# Patient Record
Sex: MALE | Race: WHITE
[De-identification: names, ages, dates, MRNs, and addresses within clinical notes are randomized per-mention and may not be internally consistent; named-entity substitution may affect disease eponyms.]

---

## 2017-06-14 ENCOUNTER — HOSPITAL ENCOUNTER (EMERGENCY)
Dept: HOSPITAL 10 - FTE | Age: 42
Discharge: HOME | End: 2017-06-14
Payer: MEDICAID

## 2017-06-14 VITALS — HEART RATE: 69 BPM | DIASTOLIC BLOOD PRESSURE: 96 MMHG | SYSTOLIC BLOOD PRESSURE: 145 MMHG | RESPIRATION RATE: 18 BRPM

## 2017-06-14 VITALS
WEIGHT: 153.22 LBS | HEIGHT: 70 IN | BODY MASS INDEX: 21.94 KG/M2 | HEIGHT: 70 IN | BODY MASS INDEX: 21.94 KG/M2 | WEIGHT: 153.22 LBS

## 2017-06-14 DIAGNOSIS — F10.10: ICD-10-CM

## 2017-06-14 DIAGNOSIS — R40.2252: ICD-10-CM

## 2017-06-14 DIAGNOSIS — F17.210: ICD-10-CM

## 2017-06-14 DIAGNOSIS — R40.2142: ICD-10-CM

## 2017-06-14 DIAGNOSIS — R00.2: Primary | ICD-10-CM

## 2017-06-14 DIAGNOSIS — R40.2362: ICD-10-CM

## 2017-06-14 PROCEDURE — 96375 TX/PRO/DX INJ NEW DRUG ADDON: CPT

## 2017-06-14 PROCEDURE — 96374 THER/PROPH/DIAG INJ IV PUSH: CPT

## 2017-06-14 PROCEDURE — 93005 ELECTROCARDIOGRAM TRACING: CPT

## 2017-06-14 NOTE — ERD
ER Documentation


Chief Complaint


Date/Time


DATE: 17 


TIME: 21:19


Chief Complaint


anxiety, chest pain





HPI


42-year-old male with a long history of alcohol abuse presents the ED 

complaining of anxiety and palpitations after drinking 2 bottles of Captain 

Dheeraj today.  Denies chest pain or shortness of breath.  Nausea but denies 

abdominal pain, vomiting, hematemesis, hematochezia or melanotic stools.  

Denies depression, suicidal or homicidal ideations.  No cough or hemoptysis.  

No leg pain or swelling.  No relieving or exacerbating factors.  No fevers or 

chills.





ROS


All systems reviewed and are negative except as per history of present illness.





Medications


Home Meds


Active Scripts


Ondansetron (Ondansetron Odt) 4 Mg Tab.rapdis, 4 MG PO Q6H Y for NAUSEA AND/OR 

VOMITING, #10 TAB


   Prov:NIEVES ROSA MD         17





Allergies


Allergies:  


Coded Allergies:  


     sulfamethoxazole (Verified  Allergy, Mild, HIVES, 17)


     trimethoprim (Verified  Allergy, Mild, HIVES, 17)





PMhx/Soc


Reviewed in chart.  As per HPI


Medical and Surgical Hx:  pt denies Surgical Hx


History of Surgery:  No


Anesthesia Reaction:  No


Hx Neurological Disorder:  No


Hx Respiratory Disorders:  No


Hx Cardiac Disorders:  No


Hx Psychiatric Problems:  No


Hx Miscellaneous Medical Probl:  Yes (GERD, ALCOHOL ABUSE)


Hx Alcohol Use:  Yes (alcoholic - drank 2 bottles of liquer friday)


Hx Substance Use:  No


Hx Tobacco Use:  Yes (10 cig/ day)


Smoking Status:  Current every day smoker





FmHx


Reviewed in chart.  No sudden cardiac death or cancer





Physical Exam


Vitals





Vital Signs








  Date Time  Temp Pulse Resp B/P Pulse Ox O2 Delivery O2 Flow Rate FiO2


 


17 19:55 99.3 125 20 142/85 97   








Physical Exam


Const:  []


Head:   Atraumatic 


Eyes:    Normal Conjunctiva


ENT:    Normal External Ears, Nose and Mouth.


Neck:               Full range of motion..~ No meningismus.


Resp:    Clear to auscultation bilaterally


Cardio:    Regular rate and rhythm, no murmurs


Abd:    Soft, non tender, non distended. Normal bowel sounds


Skin:    No petechiae or rashes


Back:    No midline or flank tenderness


Ext:    No cyanosis, or edema


Neur:    Awake and alert


Psych:    Normal Mood and Affect


Results 24 hrs








 Current Medications








 Medications


  (Trade)  Dose


 Ordered  Sig/Dejon


 Route


 PRN Reason  Start Time


 Stop Time Status Last Admin


Dose Admin


 


 Sodium Chloride


  (NS)  1,000 ml @ 


 1,000 mls/hr  Q1H STAT


 IV


   17 21:19


 17 22:18 DC 17 21:25


 


 


 Ondansetron HCl


  (Zofran Inj)  4 mg  ONCE  STAT


 IV


   17 21:19


 17 21:21 DC 17 21:25


 


 


 Famotidine


  (Pepcid)  20 mg  ONCE  STAT


 PO


   17 21:19


 17 21:21 DC 17 21:25


 


 


 Lorazepam


  (Ativan)  1 mg  ONCE  ONCE


 IV


   17 21:30


 17 21:31 DC 17 21:25


 


 


 Thiamine HCl


  (Vitamin B1)  100 mg  ONCE  ONCE


 IV


   17 21:30


 17 21:31 DC 17 21:33


 














Procedures/MDM


DOCUMENTS REVIEWED:   ED nurse, prior ED, prior





ED COURSE: Normal saline 1 L.  Thiamine 100 mg.  Ativan 1 mg.  Zofran 4 mg





REEXAMINATION/REEVALUATION: Time: 23:00.  Doing well.  Heart rate 69, sinus 

rhythm without ectopy.  Symptoms resolved and wants to go home.








MEDICAL DECISION MAKIN-year-old male with a long history of alcohol abuse 

presents the ED complaining of anxiety and palpitations after drinking 2 

bottles of Captain Dheeraj today.  Patient presents with acute anxiety and mild 

alcohol withdrawal.  No hallucinations or evidence of DTs.  Symptoms completely 

resolved with intravenous hydration and lorazepam.  Abdominal exam is benign 

without rebound, guarding or signs of peritonitis.  Sinus tachycardia resolved 

with intravenous hydration and anxiolytics.  Stable for discharge with 

precautionary instructions and outpatient follow-up as counseled.  Extensive 

alcohol cessation counseling provided.








Counseled patient regarding diagnostic workup, diagnosis and need for followup. 

Understands to return to ED if symptoms recur, worsen or any other concerns.





Departure


Diagnosis:  


 Primary Impression:  


 Palpitations


 Additional Impressions:  


 Acute anxiety


 Chronic alcohol abuse


Condition:  Serious











CATRACHITA TREVIZO MD 2017 21:19

## 2017-06-16 ENCOUNTER — HOSPITAL ENCOUNTER (EMERGENCY)
Dept: HOSPITAL 10 - FTE | Age: 42
Discharge: HOME | End: 2017-06-16
Payer: MEDICAID

## 2017-06-16 VITALS — HEART RATE: 88 BPM | DIASTOLIC BLOOD PRESSURE: 84 MMHG | RESPIRATION RATE: 16 BRPM | SYSTOLIC BLOOD PRESSURE: 132 MMHG

## 2017-06-16 VITALS
BODY MASS INDEX: 19.52 KG/M2 | BODY MASS INDEX: 19.52 KG/M2 | HEIGHT: 74 IN | WEIGHT: 152.12 LBS | WEIGHT: 152.12 LBS | HEIGHT: 74 IN

## 2017-06-16 DIAGNOSIS — F17.210: ICD-10-CM

## 2017-06-16 DIAGNOSIS — R11.10: ICD-10-CM

## 2017-06-16 DIAGNOSIS — R10.13: Primary | ICD-10-CM

## 2017-06-16 LAB
ADD SCAN DIFF: NO
ADD UMIC: YES
ALBUMIN SERPL-MCNC: 4.6 G/DL (ref 3.3–4.9)
ALBUMIN/GLOB SERPL: 1.43 {RATIO}
ALP SERPL-CCNC: 139 IU/L (ref 42–121)
ALT SERPL-CCNC: 59 IU/L (ref 13–69)
ANION GAP SERPL CALC-SCNC: 14 MMOL/L (ref 8–16)
AST SERPL-CCNC: 47 IU/L (ref 15–46)
BASOPHILS # BLD AUTO: 0 10^3/UL (ref 0–0.1)
BASOPHILS NFR BLD: 0.5 % (ref 0–2)
BILIRUB DIRECT SERPL-MCNC: 0 MG/DL (ref 0–0.2)
BILIRUB SERPL-MCNC: 0.6 MG/DL (ref 0.2–1.3)
BUN SERPL-MCNC: 4 MG/DL (ref 7–20)
CALCIUM SERPL-MCNC: 9.6 MG/DL (ref 8.4–10.2)
CHLORIDE SERPL-SCNC: 104 MMOL/L (ref 97–110)
CO2 SERPL-SCNC: 25 MMOL/L (ref 21–31)
COLOR UR: (no result)
CREAT SERPL-MCNC: 0.69 MG/DL (ref 0.61–1.24)
EOSINOPHIL # BLD: 0.1 10^3/UL (ref 0–0.5)
EOSINOPHIL NFR BLD: 1.5 % (ref 0–7)
ERYTHROCYTE [DISTWIDTH] IN BLOOD BY AUTOMATED COUNT: 11.9 % (ref 11.5–14.5)
GLOBULIN SER-MCNC: 3.2 G/DL (ref 1.3–3.2)
GLUCOSE SERPL-MCNC: 97 MG/DL (ref 70–220)
GLUCOSE UR STRIP-MCNC: NEGATIVE %
HCT VFR BLD CALC: 46.5 % (ref 42–52)
HGB BLD-MCNC: 16 G/DL (ref 14–18)
KETONES UR STRIP.AUTO-MCNC: NEGATIVE MG/DL
LYMPHOCYTES # BLD AUTO: 2.5 10^3/UL (ref 0.8–2.9)
LYMPHOCYTES NFR BLD AUTO: 29 % (ref 15–51)
MCH RBC QN AUTO: 30.8 PG (ref 29–33)
MCHC RBC AUTO-ENTMCNC: 34.4 G/DL (ref 32–37)
MCV RBC AUTO: 89.6 FL (ref 82–101)
MONOCYTES # BLD: 0.4 10^3/UL (ref 0.3–0.9)
MONOCYTES NFR BLD: 5.1 % (ref 0–11)
NEUTROPHILS # BLD: 5.5 10^3/UL (ref 1.6–7.5)
NEUTROPHILS NFR BLD AUTO: 63.7 % (ref 39–77)
NITRITE UR QL STRIP.AUTO: NEGATIVE
NRBC # BLD MANUAL: 0 10^3/UL (ref 0–0)
NRBC BLD QL: 0 /100WBC (ref 0–0)
PLATELET # BLD: 269 10^3/UL (ref 140–415)
PMV BLD AUTO: 10.4 FL (ref 7.4–10.4)
POTASSIUM SERPL-SCNC: 3.4 MMOL/L (ref 3.5–5.1)
PROT SERPL-MCNC: 7.8 G/DL (ref 6.1–8.1)
RBC # BLD AUTO: 5.19 10^6/UL (ref 4.7–6.1)
RBC # UR AUTO: (no result) /UL
RBC #/AREA URNS HPF: (no result) /HPF
SODIUM SERPL-SCNC: 140 MMOL/L (ref 135–144)
UR BILIRUBIN (DIP): NEGATIVE
UR CLARITY: CLEAR
UR TOTAL PROTEIN (DIP): NEGATIVE
UROBILINOGEN UR STRIP-ACNC: (no result) (ref 0.1–1)
WBC # BLD AUTO: 8.6 10^3/UL (ref 4.8–10.8)
WBC # UR STRIP: NEGATIVE /UL

## 2017-06-16 PROCEDURE — 80053 COMPREHEN METABOLIC PANEL: CPT

## 2017-06-16 PROCEDURE — 83690 ASSAY OF LIPASE: CPT

## 2017-06-16 PROCEDURE — 36415 COLL VENOUS BLD VENIPUNCTURE: CPT

## 2017-06-16 PROCEDURE — 96374 THER/PROPH/DIAG INJ IV PUSH: CPT

## 2017-06-16 PROCEDURE — 74176 CT ABD & PELVIS W/O CONTRAST: CPT

## 2017-06-16 PROCEDURE — 85025 COMPLETE CBC W/AUTO DIFF WBC: CPT

## 2017-06-16 PROCEDURE — 81001 URINALYSIS AUTO W/SCOPE: CPT

## 2017-06-16 NOTE — ERD
ER Documentation


Chief Complaint


Date/Time


DATE: 17 


TIME: 20:14


Chief Complaint


R jaw pain since this morning





HPI


Patient is a 42-year-old male who presents to the ED with abdominal pain, back 

pain and and right sided face pain on and off x 1 week.  Patient was seen here 

in the ER 2 days ago.  Patient has a history of alcohol abuse.  From Friday to 

Wednesday patient had 2 bottles of Captain Dheeraj and 20 large cans of beer.  

Patient states that he has had multiple episodes of nonbloody nonbilious emesis 

for the last week.  States that he had a decrease in appetite and feels 

nauseous.  Denies chest pain or cough or shortness of breath.  Denies headache 

or dizziness.  Denies leg pain or swelling.  Denies diarrhea.  He states that 

he has not had any alcohol in the last 2 days.  He has tolerated fluids such as 

orange juice and water. Denies trauma or hitting his head. Denies blurry vision.





ROS


All systems reviewed and are negative except as per history of present illness.





Medications


Home Meds


Active Scripts


Famotidine* (Pepcid*) 20 Mg Tablet, 20 MG PO BID for 14 Days, TAB


   Prov:KEVIN MARIE PA-C         17


Ondansetron (Ondansetron Odt) 4 Mg Tab.rapdis, 4 MG PO Q6H Y for NAUSEA AND/OR 

VOMITING, #10 TAB


   Prov:KEVIN MARIE PA-C         17


Ondansetron (Ondansetron Odt) 4 Mg Tab.rapdis, 4 MG PO Q6H Y for NAUSEA AND/OR 

VOMITING, #10 TAB


   Prov:NIEVES ROSA MD         17





Allergies


Allergies:  


Coded Allergies:  


     sulfamethoxazole (Verified  Allergy, Mild, HIVES, 17)


     trimethoprim (Verified  Allergy, Mild, HIVES, 17)





PMhx/Soc


History of Surgery:  No


Anesthesia Reaction:  No


Hx Neurological Disorder:  No


Hx Respiratory Disorders:  No


Hx Cardiac Disorders:  No


Hx Psychiatric Problems:  No


Hx Miscellaneous Medical Probl:  Yes (GERD, ALCOHOL ABUSE)


Hx Alcohol Use:  Yes (Beer)


Hx Substance Use:  No


Hx Tobacco Use:  Yes (10 cig/ day)


Smoking Status:  Current every day smoker





FmHx


Family History:  No coronary disease, No diabetes, No other





Physical Exam


Vitals





Vital Signs








  Date Time  Temp Pulse Resp B/P Pulse Ox O2 Delivery O2 Flow Rate FiO2


 


17 19:24 98.7 101 18 142/92 97   








Physical Exam





GENERAL: Well-developed, well-nourished male. Appears in no acute distress. 


HEAD: Normocephalic, atraumatic. 


EYES: Pupils are equally reactive bilaterally. EOMs grossly intact. No 

conjunctival erythema. 


ENT: Moist mucous membranes. No uvula deviation. No kissing tonsils. No 

exudates. 


NECK: Supple. No lymphadenopathy or thyromegaly. No meningismus. negative 

kernig. negative brudinski.  


LUNG: Clear to auscultation bilaterally. No rhonchi, wheezing, rales or coarse 

breath sounds. 


HEART: Regular rate and rhythm. No murmurs, rubs or gallops.


ABDOMEN: No scars, ecchymosis or rashes noted. Soft,  Positive bowel sounds in 

all four quadrants. No rebound tenderness, no guarding. (-) McBurneys point 

tenderness.  Tenderness in the epigastric and right CVA.


BACK: No midline tenderness. 


Extremities: Equal pulses bilaterally. No peripheral clubbing, cyanosis or 

edema. No unilateral leg swelling.


NEUROLOGIC: Alert and oriented. Moving all four extremities. 5/5 strength in 

all extremities. Normal speech. Steady gait. 


SKIN: Normal color. Warm and dry. No rashes or lesions. Capillary refill < 2 

seconds


Result Diagram:  


17





Results 24 hrs





 Laboratory Tests








Test


  17


20:25 17


21:30


 


White Blood Count 8.610^3/ul  


 


Red Blood Count 5.1910^6/ul  


 


Hemoglobin 16.0g/dl  


 


Hematocrit 46.5%  


 


Mean Corpuscular Volume 89.6fl  


 


Mean Corpuscular Hemoglobin 30.8pg  


 


Mean Corpuscular Hemoglobin


Concent 34.4g/dl 


  


 


 


Red Cell Distribution Width 11.9%  


 


Platelet Count 76533^3/UL  


 


Mean Platelet Volume 10.4fl  


 


Neutrophils % 63.7%  


 


Lymphocytes % 29.0%  


 


Monocytes % 5.1%  


 


Eosinophils % 1.5%  


 


Basophils % 0.5%  


 


Nucleated Red Blood Cells % 0.0/100WBC  


 


Neutrophils # 5.510^3/ul  


 


Lymphocytes # 2.510^3/ul  


 


Monocytes # 0.410^3/ul  


 


Eosinophils # 0.110^3/ul  


 


Basophils # 0.010^3/ul  


 


Nucleated Red Blood Cells # 0.010^3/ul  


 


Sodium Level 140mmol/L  


 


Potassium Level 3.4mmol/L  


 


Chloride Level 104mmol/L  


 


Carbon Dioxide Level 25mmol/L  


 


Anion Gap 14  


 


Blood Urea Nitrogen 4mg/dl  


 


Creatinine 0.69mg/dl  


 


Glucose Level 97mg/dl  


 


Calcium Level 9.6mg/dl  


 


Total Bilirubin 0.6mg/dl  


 


Direct Bilirubin 0.00mg/dl  


 


Indirect Bilirubin 0.6mg/dl  


 


Aspartate Amino Transf


(AST/SGOT) 47IU/L 


  


 


 


Alanine Aminotransferase


(ALT/SGPT) 59IU/L 


  


 


 


Alkaline Phosphatase 139IU/L  


 


Total Protein 7.8g/dl  


 


Albumin 4.6g/dl  


 


Globulin 3.20g/dl  


 


Albumin/Globulin Ratio 1.43  


 


Lipase 43U/L  


 


Urine Color  LT. YELLOW 


 


Urine Clarity  CLEAR 


 


Urine pH  6.5 


 


Urine Specific Gravity  <=1.005 


 


Urine Ketones  NEGATIVE 


 


Urine Nitrite  NEGATIVE 


 


Urine Bilirubin  NEGATIVE 


 


Urine Urobilinogen  0.2  E.U./dL 


 


Urine Leukocyte Esterase  NEGATIVE 


 


Urine Microscopic RBC  0-2/HPF 


 


Urine Microscopic WBC  NONE SEEN/HPF 


 


Urine Epithelial Cells  OCCASIONAL 


 


Urine Hemoglobin  TRACE 


 


Urine Glucose  NEGATIVE% 


 


Urine Total Protein  NEGATIVE 








 Current Medications








 Medications


  (Trade)  Dose


 Ordered  Sig/Dejon


 Route


 PRN Reason  Start Time


 Stop Time Status Last Admin


Dose Admin


 


 Sodium Chloride


  (NS)  1,000 ml @ 


 1,000 mls/hr  Q1H STAT


 IV


   17 20:06


 17 21:05 DC 17 20:23


 


 


 Ondansetron HCl


  (Zofran Inj)  4 mg  ONCE  STAT


 IV


   17 20:06


 17 20:07 DC 17 20:23


 


 


 Famotidine


  (Pepcid)  20 mg  ONCE  STAT


 PO


   17 20:06


 17 20:07 DC 17 20:23


 


 


 Lorazepam


  (Ativan)  1 mg  ONCE  ONCE


 PO


   17 21:30


 17 21:31 DC 17 21:08


 











Procedures/MDM


ER COURSE:


I kept the patient and/or family informed of laboratory and diagnostic imaging 

results throughout the emergency room course.





EKG, MONITORS, & DIAGNOSTIC IMAGING:


 [place] [address]


 Radiology Main Line: [phone]





 DIAGNOSTIC IMAGING REPORT





 Patient: XENIA PÉREZ   : 1975   Age: 42  Sex: M             

           


 MR #:    [id number]   Acct #:   [id number]    DOS: 17


 Ordering MD: KEVIN MARIE PA-C   Location:  FT   Room/Bed:           

                                 


 








PROCEDURE:   CT abdomen and pelvis without intravenous contrast. 


 


CLINICAL INDICATION: Pain.


 


TECHNIQUE:   CT of the abdomen/pelvis was performed utilizing axial images with 

reconstructions in sagittal and coronal planes. The administered radiation dose 

is CTDI 7.5 mGy,  mGy-cm. 


 


COMPARISON: 2017


 


FINDINGS:


 


Visualized Chest: The visualized lung bases are clear.


 


Abdomen:


 


The  spleen, pancreas, gallbladder,and adrenal glands are unremarkable.   The 

liver is diffusely decreased in attenuation, compatible with hepatic steatosis.


 


The kidneys are without hydronephrosis.  No definite urinary calculi are seen.


 


There is no evidence of bowel obstruction.  The appendix is normal.  No intra-

abdominal free air is seen.  


 


There is no evidence of intra-abdominal adenopathy or free fluid.  


 


 


Pelvis:


 


There is no evidence of pelvic adenopathy or free fluid.  The prostate and 

bladder are unremarkable.


 


Osseous structures: Unremarkable.


 


IMPRESSION:


 


No acute findings.


 


Hepatic steatosis.


 


RPTAT: HIKT


_____________________________________________ 


.Tyrell Menjivar MD, MD           Date    Time 


Electronically viewed and signed by .Tyrell Menjivar MD, MD on 2017 21:19 


 


D:  2017 21:19  T:  2017 21:19


.T/





CC: KEVIN MARIE PA-C








MEDICATIONS:


1 mg Ativan.  1 L IV fluids.  Zofran and Pepcid.  Tolerated well and stated 

improvement in symptoms





LAB INTERPRETATION:


CBC showed no evidence of systemic infection or severe anemia. CMP showed no 

evidence of electrolyte abnormalities, severe acidosis, alkalosis, renal failure

, or liver disease. Lipase showed no evidence of acute pancreatitis. UA showed 

no evidence of leukocytes, nitrites or hematuria. 





MEDICAL DECISION MAKING:


This is a 42-year-old male who presents with epigastric pain and vomiting 1 

week. Vital signs were reviewed. Patient is afebrile. Patient is not hypoxic.  

Patient is not toxic or ill-appearing.  Patient was seen here 3 days ago for 

similar symptoms however patient did not receive any laboratory studies or 

workup.  Therefore a full workup was done as patient did have epigastric pain 

and vomiting with a history of alcohol abuse.  However I have low suspicion for 

pancreatitis.  Low suspicion for ACS, AAA, perforated ulcer, bowel obstruction, 

cholecystitis, choledocholithiasis, cholangitis, pancreatitis, hepatic abscess, 

appendicitis, diverticulitis, gastroenteritis, hepatitis, peptic ulcer disease.

  Low suspicion for cardiac emergency.  Low suspicion for delirium tremens.





DISCHARGE:


At this time, patient is stable for discharge and outpatient management with no 

new complaints during the ER course. Patient was sent home with Zofran and 

Pepcid. Patient will be discharged home with instructions to recheck for new or 

worsening symptoms such as fever, nausea, weakness, LOC and to follow up with 

primary care in the next 1-2 days. Patient was advised to return to the ER for 

any new or worsening symptoms. Plan was discussed and patient and/or family 

understands and agrees. Home instructions were given.





Departure


Diagnosis:  


 Primary Impression:  


 Abdominal pain


 Abdominal location:  epigastric  Qualified Code:  R10.13 - Epigastric pain


Condition:  Stable











KEVIN MARIE PA-C 2017 20:17

## 2017-06-16 NOTE — RADRPT
PROCEDURE:   CT abdomen and pelvis without intravenous contrast. 

 

CLINICAL INDICATION: Pain.

 

TECHNIQUE:   CT of the abdomen/pelvis was performed utilizing axial images with reconstructions in s
agittal and coronal planes. The administered radiation dose is CTDI 7.5 mGy,  mGy-cm. 

 

COMPARISON: 01/26/2017

 

FINDINGS:

 

Visualized Chest: The visualized lung bases are clear.

 

Abdomen:

 

The  spleen, pancreas, gallbladder,and adrenal glands are unremarkable.   The liver is diffusely dec
reased in attenuation, compatible with hepatic steatosis.

 

The kidneys are without hydronephrosis.  No definite urinary calculi are seen.

 

There is no evidence of bowel obstruction.  The appendix is normal.  No intra-abdominal free air is 
seen.  

 

There is no evidence of intra-abdominal adenopathy or free fluid.  

 

 

Pelvis:

 

There is no evidence of pelvic adenopathy or free fluid.  The prostate and bladder are unremarkable.

 

Osseous structures: Unremarkable.

 

IMPRESSION:

 

No acute findings.

 

Hepatic steatosis.

 

RPTAT: HIKT

_____________________________________________ 

.Tyrell Menjivar MD, MD           Date    Time 

Electronically viewed and signed by .Tyrell Menjivar MD, MD on 06/16/2017 21:19 

 

D:  06/16/2017 21:19  T:  06/16/2017 21:19

.T/

## 2017-07-06 ENCOUNTER — HOSPITAL ENCOUNTER (EMERGENCY)
Dept: HOSPITAL 10 - FTE | Age: 42
Discharge: HOME | End: 2017-07-06
Payer: MEDICAID

## 2017-07-06 VITALS
WEIGHT: 171.96 LBS | BODY MASS INDEX: 22.79 KG/M2 | WEIGHT: 171.96 LBS | HEIGHT: 73 IN | HEIGHT: 73 IN | BODY MASS INDEX: 22.79 KG/M2

## 2017-07-06 VITALS
RESPIRATION RATE: 20 BRPM | DIASTOLIC BLOOD PRESSURE: 84 MMHG | SYSTOLIC BLOOD PRESSURE: 142 MMHG | TEMPERATURE: 98.2 F | HEART RATE: 90 BPM

## 2017-07-06 DIAGNOSIS — R11.10: Primary | ICD-10-CM

## 2017-07-06 DIAGNOSIS — R19.7: ICD-10-CM

## 2017-07-06 DIAGNOSIS — F17.210: ICD-10-CM

## 2017-07-06 LAB
ADD SCAN DIFF: NO
ALBUMIN SERPL-MCNC: 5.1 G/DL (ref 3.3–4.9)
ALBUMIN/GLOB SERPL: 1.59 {RATIO}
ALP SERPL-CCNC: 127 IU/L (ref 42–121)
ALT SERPL-CCNC: 56 IU/L (ref 13–69)
ANION GAP SERPL CALC-SCNC: 24 MMOL/L (ref 8–16)
AST SERPL-CCNC: 59 IU/L (ref 15–46)
BASOPHILS # BLD AUTO: 0.1 10^3/UL (ref 0–0.1)
BASOPHILS NFR BLD: 0.7 % (ref 0–2)
BILIRUB DIRECT SERPL-MCNC: 0 MG/DL (ref 0–0.2)
BILIRUB SERPL-MCNC: 1 MG/DL (ref 0.2–1.3)
BUN SERPL-MCNC: 8 MG/DL (ref 7–20)
CALCIUM SERPL-MCNC: 9.1 MG/DL (ref 8.4–10.2)
CHLORIDE SERPL-SCNC: 96 MMOL/L (ref 97–110)
CO2 SERPL-SCNC: 19 MMOL/L (ref 21–31)
CREAT SERPL-MCNC: 0.81 MG/DL (ref 0.61–1.24)
EOSINOPHIL # BLD: 0 10^3/UL (ref 0–0.5)
EOSINOPHIL NFR BLD: 0.1 % (ref 0–7)
ERYTHROCYTE [DISTWIDTH] IN BLOOD BY AUTOMATED COUNT: 12.8 % (ref 11.5–14.5)
GLOBULIN SER-MCNC: 3.2 G/DL (ref 1.3–3.2)
GLUCOSE SERPL-MCNC: 122 MG/DL (ref 70–220)
HCT VFR BLD CALC: 47.4 % (ref 42–52)
HGB BLD-MCNC: 17 G/DL (ref 14–18)
LYMPHOCYTES # BLD AUTO: 1.1 10^3/UL (ref 0.8–2.9)
LYMPHOCYTES NFR BLD AUTO: 13.1 % (ref 15–51)
MCH RBC QN AUTO: 31.9 PG (ref 29–33)
MCHC RBC AUTO-ENTMCNC: 35.9 G/DL (ref 32–37)
MCV RBC AUTO: 88.9 FL (ref 82–101)
MONOCYTES # BLD: 0.5 10^3/UL (ref 0.3–0.9)
MONOCYTES NFR BLD: 6.1 % (ref 0–11)
NEUTROPHILS # BLD: 6.8 10^3/UL (ref 1.6–7.5)
NEUTROPHILS NFR BLD AUTO: 79.9 % (ref 39–77)
NRBC # BLD MANUAL: 0 10^3/UL (ref 0–0)
NRBC BLD QL: 0 /100WBC (ref 0–0)
PLATELET # BLD: 279 10^3/UL (ref 140–415)
PMV BLD AUTO: 9.7 FL (ref 7.4–10.4)
POTASSIUM SERPL-SCNC: 3.5 MMOL/L (ref 3.5–5.1)
PROT SERPL-MCNC: 8.3 G/DL (ref 6.1–8.1)
RBC # BLD AUTO: 5.33 10^6/UL (ref 4.7–6.1)
SODIUM SERPL-SCNC: 135 MMOL/L (ref 135–144)
WBC # BLD AUTO: 8.5 10^3/UL (ref 4.8–10.8)

## 2017-07-06 PROCEDURE — 80053 COMPREHEN METABOLIC PANEL: CPT

## 2017-07-06 PROCEDURE — 36415 COLL VENOUS BLD VENIPUNCTURE: CPT

## 2017-07-06 PROCEDURE — 96374 THER/PROPH/DIAG INJ IV PUSH: CPT

## 2017-07-06 PROCEDURE — C9113 INJ PANTOPRAZOLE SODIUM, VIA: HCPCS

## 2017-07-06 PROCEDURE — 85025 COMPLETE CBC W/AUTO DIFF WBC: CPT

## 2017-07-06 PROCEDURE — 96375 TX/PRO/DX INJ NEW DRUG ADDON: CPT

## 2017-07-06 PROCEDURE — 83690 ASSAY OF LIPASE: CPT

## 2017-07-06 NOTE — ERD
ER Documentation


Chief Complaint


Date/Time


DATE: 7/6/17 


TIME: 11:56


Chief Complaint


VOMITING AND DIARRHEA ,





HPI


This 42-year-old male complains of 3 day history of vomiting diarrhea.  Patient 

complains of epigastric abdominal pain as well.  Patient has a history of 

gastritis and alcohol abuse.  Patient has a fevers, blood, lower abdominal 

pain.  Patient denies he is taking any medications for gastritis or otherwise.  

Patient admits to drinking heavily over the last few days.





ROS


All systems reviewed and are negative except as per history of present illness.





Medications


Home Meds


Active Scripts


Pantoprazole* (Protonix*) 40 Mg Tablet.dr, 40 MG PO DAILY, #30 TAB


   Prov:TAVO RUDD MD         7/6/17


Lorazepam* (Lorazepam*) 1 Mg Tablet, 1 MG PO Q8, #10 TAB


   Prov:TAVO RUDD MD         7/6/17


Ondansetron (Ondansetron Odt) 8 Mg Tab.rapdis, 8 MG PO Q6H Y for NAUSEA AND/OR 

VOMITING, #8 TAB


   Prov:TAVO RUDD MD         7/6/17


Famotidine* (Pepcid*) 20 Mg Tablet, 20 MG PO BID for 14 Days, TAB


   Prov:KEVIN MARIE PA-C         6/16/17


Ondansetron (Ondansetron Odt) 4 Mg Tab.rapdis, 4 MG PO Q6H Y for NAUSEA AND/OR 

VOMITING, #10 TAB


   Prov:KEVIN MARIE PA-C         6/16/17


Ondansetron (Ondansetron Odt) 4 Mg Tab.rapdis, 4 MG PO Q6H Y for NAUSEA AND/OR 

VOMITING, #10 TAB


   Prov:NIEVES ROSA MD         5/25/17





Allergies


Allergies:  


Coded Allergies:  


     sulfamethoxazole (Verified  Allergy, Mild, HIVES, 5/25/17)


     trimethoprim (Verified  Allergy, Mild, HIVES, 5/25/17)





PMhx/Soc


History of Surgery:  No


Anesthesia Reaction:  No


Hx Neurological Disorder:  No


Hx Respiratory Disorders:  No


Hx Cardiac Disorders:  No


Hx Psychiatric Problems:  No


Hx Miscellaneous Medical Probl:  Yes (GERD, ALCOHOL ABUSE)


Hx Alcohol Use:  Yes (Beer)


Hx Substance Use:  No


Hx Tobacco Use:  Yes (10 cig/ day)


Smoking Status:  Current every day smoker





Physical Exam


Vitals





Vital Signs








  Date Time  Temp Pulse Resp B/P Pulse Ox O2 Delivery O2 Flow Rate FiO2


 


7/6/17 09:30 98.1 128 20 137/86 99   








Physical Exam


Const:  [] Alert, not ill-appearing


Head:   Atraumatic 


Eyes:    Normal Conjunctiva


ENT:    Normal External Ears, Nose and Mouth.


Neck:               Full range of motion..~ No meningismus.


Resp:    Clear to auscultation bilaterally


Cardio:    Regular rate and rhythm, no murmurs


Abd:    Soft, minimal epigastric tenderness.  No rebound no masses.  non 

distended. Normal bowel sounds


Skin:    No petechiae or rashes


Back:    No midline or flank tenderness


Ext:    No cyanosis, or edema


Neur:    Awake and alert


Psych:    Normal Mood and Affect


Result Diagram:  


7/6/17 1035                                                                    

            7/6/17 1035





Results 24 hrs





 Laboratory Tests








Test


  7/6/17


10:35


 


White Blood Count 8.510^3/ul 


 


Red Blood Count 5.3310^6/ul 


 


Hemoglobin 17.0g/dl 


 


Hematocrit 47.4% 


 


Mean Corpuscular Volume 88.9fl 


 


Mean Corpuscular Hemoglobin 31.9pg 


 


Mean Corpuscular Hemoglobin


Concent 35.9g/dl 


 


 


Red Cell Distribution Width 12.8% 


 


Platelet Count 16655^3/UL 


 


Mean Platelet Volume 9.7fl 


 


Neutrophils % 79.9% 


 


Lymphocytes % 13.1% 


 


Monocytes % 6.1% 


 


Eosinophils % 0.1% 


 


Basophils % 0.7% 


 


Nucleated Red Blood Cells % 0.0/100WBC 


 


Neutrophils # 6.810^3/ul 


 


Lymphocytes # 1.110^3/ul 


 


Monocytes # 0.510^3/ul 


 


Eosinophils # 0.010^3/ul 


 


Basophils # 0.110^3/ul 


 


Nucleated Red Blood Cells # 0.010^3/ul 


 


Sodium Level 135mmol/L 


 


Potassium Level 3.5mmol/L 


 


Chloride Level 96mmol/L 


 


Carbon Dioxide Level 19mmol/L 


 


Anion Gap 24 


 


Blood Urea Nitrogen 8mg/dl 


 


Creatinine 0.81mg/dl 


 


Glucose Level 122mg/dl 


 


Calcium Level 9.1mg/dl 


 


Total Bilirubin 1.0mg/dl 


 


Direct Bilirubin 0.00mg/dl 


 


Indirect Bilirubin 1.0mg/dl 


 


Aspartate Amino Transf


(AST/SGOT) 59IU/L 


 


 


Alanine Aminotransferase


(ALT/SGPT) 56IU/L 


 


 


Alkaline Phosphatase 127IU/L 


 


Total Protein 8.3g/dl 


 


Albumin 5.1g/dl 


 


Globulin 3.20g/dl 


 


Albumin/Globulin Ratio 1.59 


 


Lipase 39U/L 








 Current Medications








 Medications


  (Trade)  Dose


 Ordered  Sig/Dejon


 Route


 PRN Reason  Start Time


 Stop Time Status Last Admin


Dose Admin


 


 Sodium Chloride


  (NS)  1,000 ml @ 


 1,000 mls/hr  Q1H STAT


 IV


   7/6/17 10:20


 7/6/17 11:19 DC 7/6/17 10:39


 


 


 Hydromorphone HCl


  (Dilaudid)  1 mg  ONCE  STAT


 IV


   7/6/17 10:20


 7/6/17 10:23 DC 7/6/17 10:38


 


 


 Ondansetron HCl


  (Zofran Inj)  4 mg  ONCE  STAT


 IV


   7/6/17 10:20


 7/6/17 10:24 DC 7/6/17 10:38


 


 


 Lorazepam


  (Ativan)  0.5 mg  ONCE  ONCE


 PO


   7/6/17 10:30


 7/6/17 10:31 DC 7/6/17 10:41


 


 


 Pantoprazole


  (Protonix Iv)  40 mg  ONCE  ONCE


 IV


   7/6/17 11:00


 7/6/17 11:01 DC 7/6/17 11:13


 


 


 Lorazepam


  (Ativan)  1 mg  ONCE  ONCE


 PO


   7/6/17 11:30


 7/6/17 11:31 DC 7/6/17 11:13


 











Procedures/MDM


IV was obtained.  Patient was given 1 L normal saline IV for the 3 days history 

of vomiting diarrhea.  Patient was given Zofran 4 mg IV and total of 1.5 mg 

Ativan for shakiness and anxiety and possibly alcohol withdrawal symptoms.  He 

was given Protonix 40 mg IV as well.  Patient initially was given Dilaudid 1 mg 

IV as well.  Patient has no acute findings on CBC or CMP or for further 

evaluation.  Patient had a benign abdomen on serial exam.  Patient presents 

with vomiting diarrhea, history of alcohol abuse and epigastric abdominal pain.

  There is no current signs or symptoms of pancreatitis, acute abdomen, 

obstruction, appendicitis or delirium tremens.  We treated a short course of 

Ativan and Zofran and Protonix at home and primary care follow-up.  Patient was 

counseled on alcohol abuse and recommendations for treatment to abstain from 

alcohol for worsening chronic medical conditions





Departure


Diagnosis:  


 Primary Impression:  


 Vomiting and diarrhea


Condition:  Stable


Patient Instructions:  Gastritis (Adult)


Referrals:  


[facility]


Usted se ha hecho un examen mdico de control que le indica que no est en tim 

condicin que requiera tratamiento urgente en el Departamento de Emergencia. Un 

estudio ms profundo y el tratamiento de [place] condicin pueden esperar sin ningn 

riesgo hasta que usted sea atendida/o en el consultorio de [place] mdico o tim cl

mariano. Es responsabilidad suya arreglar tim darío para el seguimiento del colette. 





MANEJO DE CONDICIONES NO URGENTES EN EL FUTURO


1) Si usted tiene un mdico de atencin primaria:





Usted debera llamar a [place] mdico de atencin primaria antes de venir al 

departamento de emergencia. Despus de las horas de consultorio, [place] doctor o [place] 

asociado/a est disponible por telfono. El mdico o enfermero de christ en el 

servicio telefnico puede asesorarle por steven medio para atender el problema, o 

colette contrario se puede programar tim darío.





2) Si usted no tiene un mdico de atencin primaria:


Llame al mdico o clnica de referencia que aparece abajo ariana las horas de 

consultorio para hacer tim darío para que le vean.





CLINICAS:


[facility]  [phone] [address] [facility]  [phone] [address] [facility] [[email or web address]] [phone] [address] [facility]  [phone] [address] [place] [phone] [address] [facility]. [phone] 


[address]





Additional Instructions:  


Examines normal hoy. Cheque otro vez con [place] doctor primario en el proximo benavides 

or regresa para mas o nueva simptomas.











TAVO RUDD MD Jul 6, 2017 11:59

## 2017-09-06 ENCOUNTER — HOSPITAL ENCOUNTER (EMERGENCY)
Dept: HOSPITAL 10 - FTE | Age: 42
Discharge: HOME | End: 2017-09-06
Payer: MEDICAID

## 2017-09-06 VITALS
BODY MASS INDEX: 18.96 KG/M2 | WEIGHT: 147.71 LBS | WEIGHT: 147.71 LBS | BODY MASS INDEX: 18.96 KG/M2 | HEIGHT: 74 IN | HEIGHT: 74 IN

## 2017-09-06 DIAGNOSIS — S42.035A: Primary | ICD-10-CM

## 2017-09-06 DIAGNOSIS — F17.210: ICD-10-CM
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
## 2017-09-06 PROCEDURE — 73030 X-RAY EXAM OF SHOULDER: CPT

## 2017-09-06 NOTE — ERD
ER Documentation


Chief Complaint


Date/Time


DATE: 9/6/17 


TIME: 14:34


Chief Complaint


Left shoulder pain s/p fall





HPI


42-year-old male complaining of left shoulder pain 4 days.  Patient stated 

that he tripped and fell 4 days ago at home, hit his left shoulder on the edge 

of the countertop.  Patient stated that he is having trouble move his left arm 

due to pain.  The pain is constant and throbbing.  He had not taking any 

medication at home for pain.  Denies any other injuries.





ROS


All systems reviewed and are negative except as per history of present illness.





Medications


Home Meds


Active Scripts


Hydrocodone/Acetaminophen (Norco 5-325 Tablet) 1 Each Tablet, 1 TAB PO Q6H Y 

for SEVERE PAIN LEVEL 7-10, #7 TAB


   Prov:CANDICE MARTINEZ NP         9/6/17


Ibuprofen* (Motrin*) 800 Mg Tab, 800 MG PO Q8, #30 TAB


   Prov:CANDICE MARTINEZ NP         9/6/17


Pantoprazole* (Protonix*) 40 Mg Tablet.dr, 40 MG PO DAILY, #30 TAB


   Prov:TAVO RUDD MD         7/6/17


Lorazepam* (Lorazepam*) 1 Mg Tablet, 1 MG PO Q8, #10 TAB


   Prov:TAVO RUDD MD         7/6/17


Ondansetron (Ondansetron Odt) 8 Mg Tab.rapdis, 8 MG PO Q6H Y for NAUSEA AND/OR 

VOMITING, #8 TAB


   Prov:TAVO RUDD MD         7/6/17


Famotidine* (Pepcid*) 20 Mg Tablet, 20 MG PO BID for 14 Days, TAB


   Prov:KEVIN MARIE PA-C         6/16/17


Ondansetron (Ondansetron Odt) 4 Mg Tab.rapdis, 4 MG PO Q6H Y for NAUSEA AND/OR 

VOMITING, #10 TAB


   Prov:KEVIN MARIE PA-C         6/16/17


Ondansetron (Ondansetron Odt) 4 Mg Tab.rapdis, 4 MG PO Q6H Y for NAUSEA AND/OR 

VOMITING, #10 TAB


   Prov:NIEVES ROSA MD         5/25/17





Allergies


Allergies:  


Coded Allergies:  


     sulfamethoxazole (Verified  Allergy, Mild, HIVES, 9/6/17)


     trimethoprim (Verified  Allergy, Mild, HIVES, 9/6/17)





PMhx/Soc


History of Surgery:  No


Anesthesia Reaction:  No


Hx Neurological Disorder:  No


Hx Respiratory Disorders:  No


Hx Cardiac Disorders:  No


Hx Psychiatric Problems:  No


Hx Miscellaneous Medical Probl:  Yes (GERD, ALCOHOL ABUSE)


Hx Alcohol Use:  Yes (Beer)


Hx Substance Use:  No


Hx Tobacco Use:  Yes (10 cig/ day)


Smoking Status:  Current every day smoker





Physical Exam


Vitals





Vital Signs








  Date Time  Temp Pulse Resp B/P Pulse Ox O2 Delivery O2 Flow Rate FiO2


 


9/6/17 11:49 97.9 110 18 151/89 97   








Physical Exam


General:    Patient is well-developed.  Awake, alert, and conversant, in no 

apparent distress


Skin:    Warm and dry


Head:    Normocephalic, atraumatic without palpable deformities


Eyes:    Pupils equal, round, and reactive to light.  Extraocular movements 

intact.  No periorbital ecchymosis or step-off


Chest:    No surface trauma.  Nontender without crepitus or deformity.  No 

palpable subcutaneous air.  Lungs have good tidal volume, lungs clear to 

auscultate bilaterally


Heart:    Regular rate and rhythm.  No murmur, rub, or gallop


Extremities:    No surface trauma.  Point tenderness at the distal left 

clavicle.  Left shoulder was limited range of motion due to pain.  Good 

strength in all extremities.  Sensation to light touch intact.  All peripheral 

pulses are intact and equal


Neuro:    Alert and oriented 4, GCS 15, cranial nerves II through XII intact.  

Motor and sensory exam is nonfocal.  Reflexes are symmetric


Results 24 hrs





 Current Medications








 Medications


  (Trade)  Dose


 Ordered  Sig/Dejon


 Route


 PRN Reason  Start Time


 Stop Time Status Last Admin


Dose Admin


 


 Ibuprofen


  (Motrin)  800 mg  ONCE  ONCE


 PO


   9/6/17 13:30


 9/6/17 13:31 DC 9/6/17 13:39


 





PROCEDURE:   Left Shoulder Series


 


CLINICAL INDICATION:   Left shoulder pain 


 


TECHNIQUE:    3 views of the left shoulder are available for review. 


 


COMPARISON:   None available 


 


FINDINGS:


 


There is a minimally displaced fracture of the distal left clavicle.  No other 

fractures are identified.  The glenohumeral joint is intact.  The 

acromioclavicular joint remains intact. The visualized portions of the left 

chest wall are within normal limits.  The soft tissues are unremarkable.  


 


IMPRESSION:


 


1.  Distal left clavicular fracture.


 


RPTAT: KK


_____________________________________________ 


.Hank Calabrese MD, MD           Date    Time 


Electronically viewed and signed by .Hank Calabrese MD, MD on 09/06/ 2017 13:38 


 


D:  09/06/2017 13:38  T:  09/06/2017 13:38


.B/





CC: CANDICE MARTINEZ NP





Procedures/MDM


Well-appearing 42-year-old male present ED with left shoulder pain after fall 4 

days ago.  X-ray of the left shoulder showed a nondisplaced minimally displaced 

fracture of the distal left clavicle.  No other fractures or abnormalities were 

seen. 





 Patient was given ibuprofen in the ED for pain.  Patient reports pain relief 

after medication.





The area of injury was immobilized with a sling. Patient was noted to be 

comfortable and neurovascularly intact both before and after the immobilization.





Patient appears well, stable for discharge and outpatient management.  Patient 

advised to follow-up with PCP for orbital referral.  Medical decision making 

shared with patient and family. Education provided to patient and family. 

Patient and family expressed understanding of the plan.





Medications on discharge: Ibuprofen, Norco.


Follow-up: Primary care provider in 2-3 days or return to ED if worse.





Disclaimer: Inadvertent spelling and grammatical errors are likely due to EHR/

dictation software use and do not reflect on the overall quality of patient 

care. Also, please note that the electronic time recorded on this note does not 

necessarily reflect the actual time of the patient encounter.





Departure


Diagnosis:  


 Primary Impression:  


 Clavicle fracture


 Encounter type:  initial encounter  Clavicle location:  lateral end  Fracture 

type:  closed  Fracture alignment:  nondisplaced  Laterality:  left  Qualified 

Code:  S42.035A - Closed nondisplaced fracture of acromial end of left clavicle

, initial encounter


Condition:  Stable


Patient Instructions:  Fracture, Clavicle


Referrals:  


COMM[facility]ITY CLINICS


YOU HAVE RECEIVED A MEDICAL SCREENING EXAM AND THE RESULTS INDICATE THAT YOU DO 

NOT HAVE A CONDITION THAT REQUIRES URGENT TREATMENT IN THE EMERGENCY DEPARTMENT.





FURTHER EVALUATION AND TREATMENT OF YOUR CONDITION CAN WAIT UNTIL YOU ARE SEEN 

IN YOUR DOCTORS OFFICE WITHIN THE NEXT 1-2 DAYS. IT IS YOUR RESPONSIBILITY TO 

MAKE AN [facility].





IF YOU HAVE A PRIMARY DOCTOR


--you should call your primary doctor and schedule an appointment





IF YOU DO NOT HAVE A PRIMARY DOCTOR YOU CAN CALL OUR PHYSICIAN REFERRAL HOTLINE 

AT


 [phone] 





IF YOU CAN NOT AFFORD TO SEE A PHYSICIAN YOU CAN CHOSE FROM THE FOLLOWING 

[facility]





[facility] [phone] [address] [facility] [phone] [address] [facility] [phone] [address] [facility] [phone] [address] [facility]. [facility] [phone] [address] [facility]. [facility] [phone] [address]





Additional Instructions:  


Specialist:Usted tiene tim condicin mdica que requiere que radha a un 

especialista dentro de los prximos 1-2 slater.POR FAVOR,CON JACOBSON SEGUIMIENTO DE 

PRIMARIA PHSICIAN refferal. SI USTED NO TIENE UN MDICO GENERAL Y / O USTED NO 

PUEDE PAGAR blossom a un mdico,los siguientes shen RECURSOS sido suministrado a 

usted. ES JACOBSON RESPONSABILIDAD PARA SER VISTOS POR EL ESPECIALISTA:











CANDICE MARTINEZ NP Sep 6, 2017 14:43

## 2017-09-06 NOTE — RADRPT
PROCEDURE:   Left Shoulder Series

 

CLINICAL INDICATION:   Left shoulder pain 

 

TECHNIQUE:    3 views of the left shoulder are available for review. 

 

COMPARISON:   None available 

 

FINDINGS:

 

There is a minimally displaced fracture of the distal left clavicle.  No other fractures are identif
ied.  The glenohumeral joint is intact.  The acromioclavicular joint remains intact. The visualized 
portions of the left chest wall are within normal limits.  The soft tissues are unremarkable.  

 

IMPRESSION:

 

1.  Distal left clavicular fracture.

 

RPTAT: KK

_____________________________________________ 

.Hank Calabrese MD, MD           Date    Time 

Electronically viewed and signed by .Hank Calabrese MD, MD on 09/06/2017 13:38 

 

D:  09/06/2017 13:38  T:  09/06/2017 13:38

.B/

## 2017-11-07 ENCOUNTER — HOSPITAL ENCOUNTER (EMERGENCY)
Dept: HOSPITAL 10 - FTE | Age: 42
Discharge: HOME | End: 2017-11-07
Payer: MEDICAID

## 2017-11-07 VITALS
HEIGHT: 74 IN | WEIGHT: 152.56 LBS | HEIGHT: 74 IN | BODY MASS INDEX: 19.58 KG/M2 | WEIGHT: 152.56 LBS | BODY MASS INDEX: 19.58 KG/M2

## 2017-11-07 VITALS
DIASTOLIC BLOOD PRESSURE: 79 MMHG | HEART RATE: 97 BPM | TEMPERATURE: 98.4 F | RESPIRATION RATE: 18 BRPM | SYSTOLIC BLOOD PRESSURE: 137 MMHG

## 2017-11-07 DIAGNOSIS — F17.210: ICD-10-CM

## 2017-11-07 DIAGNOSIS — R11.10: ICD-10-CM

## 2017-11-07 DIAGNOSIS — R19.7: ICD-10-CM

## 2017-11-07 DIAGNOSIS — F41.9: Primary | ICD-10-CM

## 2017-11-07 PROCEDURE — 83690 ASSAY OF LIPASE: CPT

## 2017-11-07 PROCEDURE — 96375 TX/PRO/DX INJ NEW DRUG ADDON: CPT

## 2017-11-07 PROCEDURE — 85025 COMPLETE CBC W/AUTO DIFF WBC: CPT

## 2017-11-07 PROCEDURE — 96374 THER/PROPH/DIAG INJ IV PUSH: CPT

## 2017-11-07 PROCEDURE — 81003 URINALYSIS AUTO W/O SCOPE: CPT

## 2017-11-07 PROCEDURE — 74177 CT ABD & PELVIS W/CONTRAST: CPT

## 2017-11-07 PROCEDURE — 36415 COLL VENOUS BLD VENIPUNCTURE: CPT

## 2017-11-07 PROCEDURE — 80053 COMPREHEN METABOLIC PANEL: CPT

## 2017-11-07 NOTE — ERD
ER Documentation


Chief Complaint


Chief Complaint


ap with n/v/d since sat





HPI


This is a 6-year-old male with history of periumbilical pain with vomiting and 

diarrhea 2 for the past 2 days.  No fever says he is very anxious as well.  

Pain is constant and crampy.  He has no chest pain shortness of breath no blood 

in his vomit or diarrhea no bile in the vomit.  No mucus in the stool no recent 

travel or known bad food exposures





ROS


All systems reviewed and are negative except as per history of present illness.





Medications


Home Meds


Active Scripts


Alprazolam* (Xanax*) 0.5 Mg Tab, 0.5 MG PO TID for ANXIETY, #14 TAB


   Prov:YOLANDA CARRSTTSERING BELLAMY DO         11/7/17


Hydrocodone/Acetaminophen (Norco 5-325 Tablet) 1 Each Tablet, 1 TAB PO Q6H Y 

for PAIN, #7 TAB


   Prov:YOLANDA CARRSTOLOS A. DO         11/7/17


Hydrocodone/Acetaminophen (Norco 5-325 Tablet) 1 Each Tablet, 1 TAB PO Q6H Y 

for SEVERE PAIN LEVEL 7-10, #7 TAB


   Prov:CANDICE MARTINEZ NP         9/6/17


Ibuprofen* (Motrin*) 800 Mg Tab, 800 MG PO Q8, #30 TAB


   Prov:CANDICE MARTINEZ NP         9/6/17


Pantoprazole* (Protonix*) 40 Mg Tablet.dr, 40 MG PO DAILY, #30 TAB


   Prov:TAVO RUDD MD         7/6/17


Lorazepam* (Lorazepam*) 1 Mg Tablet, 1 MG PO Q8, #10 TAB


   Prov:ATVO RUDD MD         7/6/17


Ondansetron (Ondansetron Odt) 8 Mg Tab.rapdis, 8 MG PO Q6H Y for NAUSEA AND/OR 

VOMITING, #8 TAB


   Prov:TAVO RUDD MD         7/6/17


Famotidine* (Pepcid*) 20 Mg Tablet, 20 MG PO BID for 14 Days, TAB


   Prov:KEVIN MARIE PA-C         6/16/17


Ondansetron (Ondansetron Odt) 4 Mg Tab.rapdis, 4 MG PO Q6H Y for NAUSEA AND/OR 

VOMITING, #10 TAB


   Prov:KEVIN MARIE PA-C         6/16/17


Ondansetron (Ondansetron Odt) 4 Mg Tab.rapdis, 4 MG PO Q6H Y for NAUSEA AND/OR 

VOMITING, #10 TAB


   Prov:NIEVES ROSA MD         5/25/17





Allergies


Allergies:  


Coded Allergies:  


     sulfamethoxazole (Verified  Allergy, Mild, HIVES, 11/7/17)


     trimethoprim (Verified  Allergy, Mild, HIVES, 11/7/17)





PMhx/Soc


Medical and Surgical Hx:  pt denies Surgical Hx


History of Surgery:  No


Anesthesia Reaction:  No


Hx Neurological Disorder:  No


Hx Respiratory Disorders:  No


Hx Cardiac Disorders:  No


Hx Psychiatric Problems:  No


Hx Miscellaneous Medical Probl:  Yes (GERD, ALCOHOL ABUSE)


Hx Alcohol Use:  Yes (Beer)


Hx Substance Use:  No


Hx Tobacco Use:  Yes (10 cig/ day)


Smoking Status:  Current every day smoker





FmHx


Family History:  No coronary disease





Physical Exam


Vitals





Vital Signs








  Date Time  Temp Pulse Resp B/P Pulse Ox O2 Delivery O2 Flow Rate FiO2


 


11/7/17 13:11 98.4 105 18 158/88 7   








Physical Exam


Const:      Well-developed, well-nourished


 Head:        Atraumatic, normocephalic


 Eyes:       Normal Conjunctiva, PERRLA, EOMI, normal sclera, no nystagmus


 ENT:         Normal External Ears, Nose and Mouth, moist mucus membranes.


 Neck:        Full range of motion.  No meningismus, no lymphadenopathy.


 Resp:         Clear to auscultation bilaterally, no wheezing, rhonchi, rales


 Cardio:       Regular rate and rhythm, no murmurs, S1 S2 present


 Abd:         Soft, mild periumbilical pain, non distended. Normal bowel sounds

, no guarding or rebound, no pulsitile abdominal masses or bruits


 Skin:         No petechiae or rashes, no ecchymosis , no maculopapular rash


 Back:        No midline or flank tenderness


 Ext:          No cyanosis, or edema, FROM x 4, normal inspection, 

neurovascularly intact x 4


 Neur:        Awake and alert, STR 5/5 x 4, sensation intact x 4, no focal 

findings, cerebellum intact


 Psych:        Normal Mood and Affect


Result Diagram:  


11/7/17 1345                                                                   

             11/7/17 1345





Results 24 hrs





 Laboratory Tests








Test


  11/7/17


13:45 11/7/17


14:33


 


White Blood Count 8.410^3/ul  


 


Red Blood Count 4.7610^6/ul  


 


Hemoglobin 14.9g/dl  


 


Hematocrit 42.7%  


 


Mean Corpuscular Volume 89.7fl  


 


Mean Corpuscular Hemoglobin 31.3pg  


 


Mean Corpuscular Hemoglobin


Concent 34.9g/dl 


  


 


 


Red Cell Distribution Width 11.7%  


 


Platelet Count 64966^3/UL  


 


Mean Platelet Volume 10.3fl  


 


Neutrophils % 65.1%  


 


Lymphocytes % 26.9%  


 


Monocytes % 6.6%  


 


Eosinophils % 0.2%  


 


Basophils % 1.0%  


 


Nucleated Red Blood Cells % 0.0/100WBC  


 


Neutrophils # 5.510^3/ul  


 


Lymphocytes # 2.310^3/ul  


 


Monocytes # 0.610^3/ul  


 


Eosinophils # 0.010^3/ul  


 


Basophils # 0.110^3/ul  


 


Nucleated Red Blood Cells # 0.010^3/ul  


 


Sodium Level 135mmol/L  


 


Potassium Level 3.8mmol/L  


 


Chloride Level 96mmol/L  


 


Carbon Dioxide Level 23mmol/L  


 


Anion Gap 20  


 


Blood Urea Nitrogen 9mg/dl  


 


Creatinine 0.65mg/dl  


 


Glucose Level 86mg/dl  


 


Calcium Level 9.2mg/dl  


 


Total Bilirubin 1.0mg/dl  


 


Direct Bilirubin 0.00mg/dl  


 


Indirect Bilirubin 1.0mg/dl  


 


Aspartate Amino Transf


(AST/SGOT) 39IU/L 


  


 


 


Alanine Aminotransferase


(ALT/SGPT) 30IU/L 


  


 


 


Alkaline Phosphatase 112IU/L  


 


Total Protein 8.1g/dl  


 


Albumin 4.5g/dl  


 


Globulin 3.60g/dl  


 


Albumin/Globulin Ratio 1.25  


 


Lipase 37U/L  


 


Urine Color  COLORLESS 


 


Urine Clarity  CLEAR 


 


Urine pH  6.0 


 


Urine Specific Gravity  1.001 


 


Urine Ketones  1+mg/dL 


 


Urine Nitrite  NEGATIVEmg/dL 


 


Urine Bilirubin  NEGATIVEmg/dL 


 


Urine Urobilinogen  NEGATIVEmg/dL 


 


Urine Leukocyte Esterase  NEGATIVELeu/ul 


 


Urine Hemoglobin  NEGATIVEmg/dL 


 


Urine Glucose  NEGATIVEmg/dL 


 


Urine Total Protein  NEGATIVEmg/dl 








 Current Medications








 Medications


  (Trade)  Dose


 Ordered  Sig/Dejon


 Route


 PRN Reason  Start Time


 Stop Time Status Last Admin


Dose Admin


 


 Hydromorphone HCl


  (Dilaudid)  1 mg  ONCE  STAT


 IV


   11/7/17 13:21


 11/7/17 13:22 DC 11/7/17 13:43


 


 


 Ondansetron HCl


  (Zofran Inj)  4 mg  ONCE  STAT


 IV


   11/7/17 13:21


 11/7/17 13:22 DC 11/7/17 13:44


 


 


 Lorazepam


  (Ativan)  1 mg  ONCE  ONCE


 IV


   11/7/17 14:00


 11/7/17 14:02 DC 11/7/17 14:09


 


 


 IV Flush 10 ml  10 ml  STK-MED ONCE


 .ROUTE


   11/7/17 14:35


 11/7/17 14:36 DC 11/7/17 15:50


 


 


 Sodium Chloride


  (NS)  100 ml @ ud  STK-MED ONCE


 .ROUTE


   11/7/17 14:35


 11/7/17 14:36 DC 11/7/17 15:54


 


 


 Iohexol


  (Omnipaque 300mg/


 ml)  150 ml  STK-MED ONCE


 .ROUTE


   11/7/17 14:35


 11/7/17 14:36 DC 11/7/17 15:54


 











Procedures/MDM





PROCEDURE:   CT Abdomen and Pelvis with contrast. 


 


CLINICAL INDICATION:    periumbilical pain


 


TECHNIQUE:   Routine abdominopelvic CT was performed following administration 

of intravenous contrast and reformatted in the axial, coronal, sagittal planes.

  


 


Intravenous contrast:  90 cc of Omnipaque-300.


Radiation dose: CTDIvol (mGy) = 8.0; total DLP (mGy-cm) = 04/08/2014


One or more of the following radiation dose techniques were used:


-Automated exposure control.


-Adjust of the mA and/or kV according to patient size.


-Use of iterative reconstruction technique.


 


COMPARISON:   06/16/2017. 


 


FINDINGS:


 


Liver, gallbladder, biliary system, pancreas, adrenal glands, and spleen are 

normal.


 


Kidneys demonstrate symmetric enhancement with mild bilateral pelviectasis 

without urolithiasis or obstructive uropathy.


 


No abnormal bowel wall thickening or dilatation. The appendix is normal. No 

mesenteric or retroperitoneal lymphadenopathy.


 


No free fluid or fluid collection.


 


Lung bases are clear.


 


No concerning bone lesions.


 


 


IMPRESSION:


 


No abdominopelvic mass, lymphadenopathy, or focal acute inflammatory process.  


 


 


 


 


 


 


 


 


 


RPTAT: EE


_____________________________________________ 


.Raza Daly MD, MD           Date    Time 


Electronically viewed and signed by .Raza Daly MD, MD on 11/07/2017 16:11 


 


D:  11/07/2017 16:11  T:  11/07/2017 16:11


.C/





CC: DICK CARR DO




















Patient likely has gastroenteritis will treat symptomatically no acute process 

no appendicitis





Departure


Diagnosis:  


 Primary Impression:  


 Abdominal pain


 Abdominal location:  lower abdomen, unspecified  Qualified Code:  R10.30 - 

Lower abdominal pain


 Additional Impressions:  


 Vomiting and diarrhea


 Anxiety


Condition:  Stable


Patient Instructions:  Abdominal Pain


Referrals:  


NO PRIMARY,CARE PHYSICIAN (PCP)











DICK CARR DO Nov 7, 2017 16:29

## 2017-11-07 NOTE — ERD
ER Documentation


Chief Complaint


Chief Complaint


ap with n/v/d since sat





HPI


This is a 6-year-old male with history of periumbilical pain with vomiting and 

diarrhea 2 for the past 2 days.  No fever says he is very anxious as well.  

Pain is constant and crampy.  He has no chest pain shortness of breath no blood 

in his vomit or diarrhea no bile in the vomit.  No mucus in the stool no recent 

travel or known bad food exposures





ROS


All systems reviewed and are negative except as per history of present illness.





Medications


Home Meds


Active Scripts


Alprazolam* (Xanax*) 0.5 Mg Tab, 0.5 MG PO TID for ANXIETY, #14 TAB


   Prov:YOLANDA CARRSTTSERING BELLAMY DO         11/7/17


Hydrocodone/Acetaminophen (Norco 5-325 Tablet) 1 Each Tablet, 1 TAB PO Q6H Y 

for PAIN, #7 TAB


   Prov:YOLANDA CARRSTOLOS A. DO         11/7/17


Hydrocodone/Acetaminophen (Norco 5-325 Tablet) 1 Each Tablet, 1 TAB PO Q6H Y 

for SEVERE PAIN LEVEL 7-10, #7 TAB


   Prov:CANDICE MARTINEZ NP         9/6/17


Ibuprofen* (Motrin*) 800 Mg Tab, 800 MG PO Q8, #30 TAB


   Prov:CANDICE MARTINEZ NP         9/6/17


Pantoprazole* (Protonix*) 40 Mg Tablet.dr, 40 MG PO DAILY, #30 TAB


   Prov:TAVO RUDD MD         7/6/17


Lorazepam* (Lorazepam*) 1 Mg Tablet, 1 MG PO Q8, #10 TAB


   Prov:TAVO RUDD MD         7/6/17


Ondansetron (Ondansetron Odt) 8 Mg Tab.rapdis, 8 MG PO Q6H Y for NAUSEA AND/OR 

VOMITING, #8 TAB


   Prov:TAVO RUDD MD         7/6/17


Famotidine* (Pepcid*) 20 Mg Tablet, 20 MG PO BID for 14 Days, TAB


   Prov:KEVIN MARIE PA-C         6/16/17


Ondansetron (Ondansetron Odt) 4 Mg Tab.rapdis, 4 MG PO Q6H Y for NAUSEA AND/OR 

VOMITING, #10 TAB


   Prov:KEVIN MARIE PA-C         6/16/17


Ondansetron (Ondansetron Odt) 4 Mg Tab.rapdis, 4 MG PO Q6H Y for NAUSEA AND/OR 

VOMITING, #10 TAB


   Prov:NIEVES ROSA MD         5/25/17





Allergies


Allergies:  


Coded Allergies:  


     sulfamethoxazole (Verified  Allergy, Mild, HIVES, 11/7/17)


     trimethoprim (Verified  Allergy, Mild, HIVES, 11/7/17)





PMhx/Soc


Medical and Surgical Hx:  pt denies Surgical Hx


History of Surgery:  No


Anesthesia Reaction:  No


Hx Neurological Disorder:  No


Hx Respiratory Disorders:  No


Hx Cardiac Disorders:  No


Hx Psychiatric Problems:  No


Hx Miscellaneous Medical Probl:  Yes (GERD, ALCOHOL ABUSE)


Hx Alcohol Use:  Yes (Beer)


Hx Substance Use:  No


Hx Tobacco Use:  Yes (10 cig/ day)


Smoking Status:  Current every day smoker





FmHx


Family History:  No coronary disease





Physical Exam


Vitals





Vital Signs








  Date Time  Temp Pulse Resp B/P Pulse Ox O2 Delivery O2 Flow Rate FiO2


 


11/7/17 13:11 98.4 105 18 158/88 7   








Physical Exam


Const:      Well-developed, well-nourished


 Head:        Atraumatic, normocephalic


 Eyes:       Normal Conjunctiva, PERRLA, EOMI, normal sclera, no nystagmus


 ENT:         Normal External Ears, Nose and Mouth, moist mucus membranes.


 Neck:        Full range of motion.  No meningismus, no lymphadenopathy.


 Resp:         Clear to auscultation bilaterally, no wheezing, rhonchi, rales


 Cardio:       Regular rate and rhythm, no murmurs, S1 S2 present


 Abd:         Soft, mild periumbilical pain, non distended. Normal bowel sounds

, no guarding or rebound, no pulsitile abdominal masses or bruits


 Skin:         No petechiae or rashes, no ecchymosis , no maculopapular rash


 Back:        No midline or flank tenderness


 Ext:          No cyanosis, or edema, FROM x 4, normal inspection, 

neurovascularly intact x 4


 Neur:        Awake and alert, STR 5/5 x 4, sensation intact x 4, no focal 

findings, cerebellum intact


 Psych:        Normal Mood and Affect


Result Diagram:  


11/7/17 1345                                                                   

             11/7/17 1345





Results 24 hrs





 Laboratory Tests








Test


  11/7/17


13:45 11/7/17


14:33


 


White Blood Count 8.410^3/ul  


 


Red Blood Count 4.7610^6/ul  


 


Hemoglobin 14.9g/dl  


 


Hematocrit 42.7%  


 


Mean Corpuscular Volume 89.7fl  


 


Mean Corpuscular Hemoglobin 31.3pg  


 


Mean Corpuscular Hemoglobin


Concent 34.9g/dl 


  


 


 


Red Cell Distribution Width 11.7%  


 


Platelet Count 71109^3/UL  


 


Mean Platelet Volume 10.3fl  


 


Neutrophils % 65.1%  


 


Lymphocytes % 26.9%  


 


Monocytes % 6.6%  


 


Eosinophils % 0.2%  


 


Basophils % 1.0%  


 


Nucleated Red Blood Cells % 0.0/100WBC  


 


Neutrophils # 5.510^3/ul  


 


Lymphocytes # 2.310^3/ul  


 


Monocytes # 0.610^3/ul  


 


Eosinophils # 0.010^3/ul  


 


Basophils # 0.110^3/ul  


 


Nucleated Red Blood Cells # 0.010^3/ul  


 


Sodium Level 135mmol/L  


 


Potassium Level 3.8mmol/L  


 


Chloride Level 96mmol/L  


 


Carbon Dioxide Level 23mmol/L  


 


Anion Gap 20  


 


Blood Urea Nitrogen 9mg/dl  


 


Creatinine 0.65mg/dl  


 


Glucose Level 86mg/dl  


 


Calcium Level 9.2mg/dl  


 


Total Bilirubin 1.0mg/dl  


 


Direct Bilirubin 0.00mg/dl  


 


Indirect Bilirubin 1.0mg/dl  


 


Aspartate Amino Transf


(AST/SGOT) 39IU/L 


  


 


 


Alanine Aminotransferase


(ALT/SGPT) 30IU/L 


  


 


 


Alkaline Phosphatase 112IU/L  


 


Total Protein 8.1g/dl  


 


Albumin 4.5g/dl  


 


Globulin 3.60g/dl  


 


Albumin/Globulin Ratio 1.25  


 


Lipase 37U/L  


 


Urine Color  COLORLESS 


 


Urine Clarity  CLEAR 


 


Urine pH  6.0 


 


Urine Specific Gravity  1.001 


 


Urine Ketones  1+mg/dL 


 


Urine Nitrite  NEGATIVEmg/dL 


 


Urine Bilirubin  NEGATIVEmg/dL 


 


Urine Urobilinogen  NEGATIVEmg/dL 


 


Urine Leukocyte Esterase  NEGATIVELeu/ul 


 


Urine Hemoglobin  NEGATIVEmg/dL 


 


Urine Glucose  NEGATIVEmg/dL 


 


Urine Total Protein  NEGATIVEmg/dl 








 Current Medications








 Medications


  (Trade)  Dose


 Ordered  Sig/Dejon


 Route


 PRN Reason  Start Time


 Stop Time Status Last Admin


Dose Admin


 


 Hydromorphone HCl


  (Dilaudid)  1 mg  ONCE  STAT


 IV


   11/7/17 13:21


 11/7/17 13:22 DC 11/7/17 13:43


 


 


 Ondansetron HCl


  (Zofran Inj)  4 mg  ONCE  STAT


 IV


   11/7/17 13:21


 11/7/17 13:22 DC 11/7/17 13:44


 


 


 Lorazepam


  (Ativan)  1 mg  ONCE  ONCE


 IV


   11/7/17 14:00


 11/7/17 14:02 DC 11/7/17 14:09


 


 


 IV Flush 10 ml  10 ml  STK-MED ONCE


 .ROUTE


   11/7/17 14:35


 11/7/17 14:36 DC 11/7/17 15:50


 


 


 Sodium Chloride


  (NS)  100 ml @ ud  STK-MED ONCE


 .ROUTE


   11/7/17 14:35


 11/7/17 14:36 DC 11/7/17 15:54


 


 


 Iohexol


  (Omnipaque 300mg/


 ml)  150 ml  STK-MED ONCE


 .ROUTE


   11/7/17 14:35


 11/7/17 14:36 DC 11/7/17 15:54


 











Procedures/MDM





PROCEDURE:   CT Abdomen and Pelvis with contrast. 


 


CLINICAL INDICATION:    periumbilical pain


 


TECHNIQUE:   Routine abdominopelvic CT was performed following administration 

of intravenous contrast and reformatted in the axial, coronal, sagittal planes.

  


 


Intravenous contrast:  90 cc of Omnipaque-300.


Radiation dose: CTDIvol (mGy) = 8.0; total DLP (mGy-cm) = 04/08/2014


One or more of the following radiation dose techniques were used:


-Automated exposure control.


-Adjust of the mA and/or kV according to patient size.


-Use of iterative reconstruction technique.


 


COMPARISON:   06/16/2017. 


 


FINDINGS:


 


Liver, gallbladder, biliary system, pancreas, adrenal glands, and spleen are 

normal.


 


Kidneys demonstrate symmetric enhancement with mild bilateral pelviectasis 

without urolithiasis or obstructive uropathy.


 


No abnormal bowel wall thickening or dilatation. The appendix is normal. No 

mesenteric or retroperitoneal lymphadenopathy.


 


No free fluid or fluid collection.


 


Lung bases are clear.


 


No concerning bone lesions.


 


 


IMPRESSION:


 


No abdominopelvic mass, lymphadenopathy, or focal acute inflammatory process.  


 


 


 


 


 


 


 


 


 


RPTAT: EE


_____________________________________________ 


.Raza Daly MD, MD           Date    Time 


Electronically viewed and signed by .Raza Daly MD, MD on 11/07/2017 16:11 


 


D:  11/07/2017 16:11  T:  11/07/2017 16:11


.C/





CC: DICK CARR DO




















Patient likely has gastroenteritis will treat symptomatically no acute process 

no appendicitis





Departure


Diagnosis:  


 Primary Impression:  


 Abdominal pain


 Abdominal location:  lower abdomen, unspecified  Qualified Code:  R10.30 - 

Lower abdominal pain


 Additional Impressions:  


 Vomiting and diarrhea


 Anxiety


Condition:  Stable


Patient Instructions:  Abdominal Pain


Referrals:  


NO PRIMARY,CARE PHYSICIAN (PCP)











DICK CARR DO Nov 7, 2017 16:29

## 2017-11-07 NOTE — RADRPT
PROCEDURE:   CT Abdomen and Pelvis with contrast. 

 

CLINICAL INDICATION:    periumbilical pain

 

TECHNIQUE:   Routine abdominopelvic CT was performed following administration of intravenous contras
t and reformatted in the axial, coronal, sagittal planes.  

 

Intravenous contrast:  90 cc of Omnipaque-300.

Radiation dose: CTDIvol (mGy) = 8.0; total DLP (mGy-cm) = 04/08/2014

One or more of the following radiation dose techniques were used:

-Automated exposure control.

-Adjust of the mA and/or kV according to patient size.

-Use of iterative reconstruction technique.

 

COMPARISON:   06/16/2017. 

 

FINDINGS:

 

Liver, gallbladder, biliary system, pancreas, adrenal glands, and spleen are normal.

 

Kidneys demonstrate symmetric enhancement with mild bilateral pelviectasis without urolithiasis or o
bstructive uropathy.

 

No abnormal bowel wall thickening or dilatation. The appendix is normal. No mesenteric or retroperit
white lymphadenopathy.

 

No free fluid or fluid collection.

 

Lung bases are clear.

 

No concerning bone lesions.

 

 

IMPRESSION:

 

No abdominopelvic mass, lymphadenopathy, or focal acute inflammatory process.  

 

 

 

 

 

 

 

 

 

RPTAT: EE

_____________________________________________ 

.Raza Daly MD, MD           Date    Time 

Electronically viewed and signed by .Raza Daly MD, MD on 11/07/2017 16:11 

 

D:  11/07/2017 16:11  T:  11/07/2017 16:11

.C/

## 2017-11-07 NOTE — ERD
ER Documentation


Chief Complaint


Chief Complaint


ap with n/v/d since sat





HPI


This is a 6-year-old male with history of periumbilical pain with vomiting and 

diarrhea 2 for the past 2 days.  No fever says he is very anxious as well.  

Pain is constant and crampy.  He has no chest pain shortness of breath no blood 

in his vomit or diarrhea no bile in the vomit.  No mucus in the stool no recent 

travel or known bad food exposures





ROS


All systems reviewed and are negative except as per history of present illness.





Medications


Home Meds


Active Scripts


Alprazolam* (Xanax*) 0.5 Mg Tab, 0.5 MG PO TID for ANXIETY, #14 TAB


   Prov:YOLANDA CARRSTTSERING BELLAMY DO         11/7/17


Hydrocodone/Acetaminophen (Norco 5-325 Tablet) 1 Each Tablet, 1 TAB PO Q6H Y 

for PAIN, #7 TAB


   Prov:YOLANDA CARRSTOLOS A. DO         11/7/17


Hydrocodone/Acetaminophen (Norco 5-325 Tablet) 1 Each Tablet, 1 TAB PO Q6H Y 

for SEVERE PAIN LEVEL 7-10, #7 TAB


   Prov:CANDICE MARTINEZ NP         9/6/17


Ibuprofen* (Motrin*) 800 Mg Tab, 800 MG PO Q8, #30 TAB


   Prov:CANDICE MARTINEZ NP         9/6/17


Pantoprazole* (Protonix*) 40 Mg Tablet.dr, 40 MG PO DAILY, #30 TAB


   Prov:TAVO RUDD MD         7/6/17


Lorazepam* (Lorazepam*) 1 Mg Tablet, 1 MG PO Q8, #10 TAB


   Prov:TAVO RUDD MD         7/6/17


Ondansetron (Ondansetron Odt) 8 Mg Tab.rapdis, 8 MG PO Q6H Y for NAUSEA AND/OR 

VOMITING, #8 TAB


   Prov:TAVO RUDD MD         7/6/17


Famotidine* (Pepcid*) 20 Mg Tablet, 20 MG PO BID for 14 Days, TAB


   Prov:KEVIN MARIE PA-C         6/16/17


Ondansetron (Ondansetron Odt) 4 Mg Tab.rapdis, 4 MG PO Q6H Y for NAUSEA AND/OR 

VOMITING, #10 TAB


   Prov:KEVIN MARIE PA-C         6/16/17


Ondansetron (Ondansetron Odt) 4 Mg Tab.rapdis, 4 MG PO Q6H Y for NAUSEA AND/OR 

VOMITING, #10 TAB


   Prov:NIEVES ROSA MD         5/25/17





Allergies


Allergies:  


Coded Allergies:  


     sulfamethoxazole (Verified  Allergy, Mild, HIVES, 11/7/17)


     trimethoprim (Verified  Allergy, Mild, HIVES, 11/7/17)





PMhx/Soc


Medical and Surgical Hx:  pt denies Surgical Hx


History of Surgery:  No


Anesthesia Reaction:  No


Hx Neurological Disorder:  No


Hx Respiratory Disorders:  No


Hx Cardiac Disorders:  No


Hx Psychiatric Problems:  No


Hx Miscellaneous Medical Probl:  Yes (GERD, ALCOHOL ABUSE)


Hx Alcohol Use:  Yes (Beer)


Hx Substance Use:  No


Hx Tobacco Use:  Yes (10 cig/ day)


Smoking Status:  Current every day smoker





FmHx


Family History:  No coronary disease





Physical Exam


Vitals





Vital Signs








  Date Time  Temp Pulse Resp B/P Pulse Ox O2 Delivery O2 Flow Rate FiO2


 


11/7/17 13:11 98.4 105 18 158/88 7   








Physical Exam


Const:      Well-developed, well-nourished


 Head:        Atraumatic, normocephalic


 Eyes:       Normal Conjunctiva, PERRLA, EOMI, normal sclera, no nystagmus


 ENT:         Normal External Ears, Nose and Mouth, moist mucus membranes.


 Neck:        Full range of motion.  No meningismus, no lymphadenopathy.


 Resp:         Clear to auscultation bilaterally, no wheezing, rhonchi, rales


 Cardio:       Regular rate and rhythm, no murmurs, S1 S2 present


 Abd:         Soft, mild periumbilical pain, non distended. Normal bowel sounds

, no guarding or rebound, no pulsitile abdominal masses or bruits


 Skin:         No petechiae or rashes, no ecchymosis , no maculopapular rash


 Back:        No midline or flank tenderness


 Ext:          No cyanosis, or edema, FROM x 4, normal inspection, 

neurovascularly intact x 4


 Neur:        Awake and alert, STR 5/5 x 4, sensation intact x 4, no focal 

findings, cerebellum intact


 Psych:        Normal Mood and Affect


Result Diagram:  


11/7/17 1345                                                                   

             11/7/17 1345





Results 24 hrs





 Laboratory Tests








Test


  11/7/17


13:45 11/7/17


14:33


 


White Blood Count 8.410^3/ul  


 


Red Blood Count 4.7610^6/ul  


 


Hemoglobin 14.9g/dl  


 


Hematocrit 42.7%  


 


Mean Corpuscular Volume 89.7fl  


 


Mean Corpuscular Hemoglobin 31.3pg  


 


Mean Corpuscular Hemoglobin


Concent 34.9g/dl 


  


 


 


Red Cell Distribution Width 11.7%  


 


Platelet Count 35876^3/UL  


 


Mean Platelet Volume 10.3fl  


 


Neutrophils % 65.1%  


 


Lymphocytes % 26.9%  


 


Monocytes % 6.6%  


 


Eosinophils % 0.2%  


 


Basophils % 1.0%  


 


Nucleated Red Blood Cells % 0.0/100WBC  


 


Neutrophils # 5.510^3/ul  


 


Lymphocytes # 2.310^3/ul  


 


Monocytes # 0.610^3/ul  


 


Eosinophils # 0.010^3/ul  


 


Basophils # 0.110^3/ul  


 


Nucleated Red Blood Cells # 0.010^3/ul  


 


Sodium Level 135mmol/L  


 


Potassium Level 3.8mmol/L  


 


Chloride Level 96mmol/L  


 


Carbon Dioxide Level 23mmol/L  


 


Anion Gap 20  


 


Blood Urea Nitrogen 9mg/dl  


 


Creatinine 0.65mg/dl  


 


Glucose Level 86mg/dl  


 


Calcium Level 9.2mg/dl  


 


Total Bilirubin 1.0mg/dl  


 


Direct Bilirubin 0.00mg/dl  


 


Indirect Bilirubin 1.0mg/dl  


 


Aspartate Amino Transf


(AST/SGOT) 39IU/L 


  


 


 


Alanine Aminotransferase


(ALT/SGPT) 30IU/L 


  


 


 


Alkaline Phosphatase 112IU/L  


 


Total Protein 8.1g/dl  


 


Albumin 4.5g/dl  


 


Globulin 3.60g/dl  


 


Albumin/Globulin Ratio 1.25  


 


Lipase 37U/L  


 


Urine Color  COLORLESS 


 


Urine Clarity  CLEAR 


 


Urine pH  6.0 


 


Urine Specific Gravity  1.001 


 


Urine Ketones  1+mg/dL 


 


Urine Nitrite  NEGATIVEmg/dL 


 


Urine Bilirubin  NEGATIVEmg/dL 


 


Urine Urobilinogen  NEGATIVEmg/dL 


 


Urine Leukocyte Esterase  NEGATIVELeu/ul 


 


Urine Hemoglobin  NEGATIVEmg/dL 


 


Urine Glucose  NEGATIVEmg/dL 


 


Urine Total Protein  NEGATIVEmg/dl 








 Current Medications








 Medications


  (Trade)  Dose


 Ordered  Sig/Dejon


 Route


 PRN Reason  Start Time


 Stop Time Status Last Admin


Dose Admin


 


 Hydromorphone HCl


  (Dilaudid)  1 mg  ONCE  STAT


 IV


   11/7/17 13:21


 11/7/17 13:22 DC 11/7/17 13:43


 


 


 Ondansetron HCl


  (Zofran Inj)  4 mg  ONCE  STAT


 IV


   11/7/17 13:21


 11/7/17 13:22 DC 11/7/17 13:44


 


 


 Lorazepam


  (Ativan)  1 mg  ONCE  ONCE


 IV


   11/7/17 14:00


 11/7/17 14:02 DC 11/7/17 14:09


 


 


 IV Flush 10 ml  10 ml  STK-MED ONCE


 .ROUTE


   11/7/17 14:35


 11/7/17 14:36 DC 11/7/17 15:50


 


 


 Sodium Chloride


  (NS)  100 ml @ ud  STK-MED ONCE


 .ROUTE


   11/7/17 14:35


 11/7/17 14:36 DC 11/7/17 15:54


 


 


 Iohexol


  (Omnipaque 300mg/


 ml)  150 ml  STK-MED ONCE


 .ROUTE


   11/7/17 14:35


 11/7/17 14:36 DC 11/7/17 15:54


 











Procedures/MDM





PROCEDURE:   CT Abdomen and Pelvis with contrast. 


 


CLINICAL INDICATION:    periumbilical pain


 


TECHNIQUE:   Routine abdominopelvic CT was performed following administration 

of intravenous contrast and reformatted in the axial, coronal, sagittal planes.

  


 


Intravenous contrast:  90 cc of Omnipaque-300.


Radiation dose: CTDIvol (mGy) = 8.0; total DLP (mGy-cm) = 04/08/2014


One or more of the following radiation dose techniques were used:


-Automated exposure control.


-Adjust of the mA and/or kV according to patient size.


-Use of iterative reconstruction technique.


 


COMPARISON:   06/16/2017. 


 


FINDINGS:


 


Liver, gallbladder, biliary system, pancreas, adrenal glands, and spleen are 

normal.


 


Kidneys demonstrate symmetric enhancement with mild bilateral pelviectasis 

without urolithiasis or obstructive uropathy.


 


No abnormal bowel wall thickening or dilatation. The appendix is normal. No 

mesenteric or retroperitoneal lymphadenopathy.


 


No free fluid or fluid collection.


 


Lung bases are clear.


 


No concerning bone lesions.


 


 


IMPRESSION:


 


No abdominopelvic mass, lymphadenopathy, or focal acute inflammatory process.  


 


 


 


 


 


 


 


 


 


RPTAT: EE


_____________________________________________ 


.Raza Daly MD, MD           Date    Time 


Electronically viewed and signed by .Raza Daly MD, MD on 11/07/2017 16:11 


 


D:  11/07/2017 16:11  T:  11/07/2017 16:11


.C/





CC: DICK CARR DO




















Patient likely has gastroenteritis will treat symptomatically no acute process 

no appendicitis





Departure


Diagnosis:  


 Primary Impression:  


 Abdominal pain


 Abdominal location:  lower abdomen, unspecified  Qualified Code:  R10.30 - 

Lower abdominal pain


 Additional Impressions:  


 Vomiting and diarrhea


 Anxiety


Condition:  Stable


Patient Instructions:  Abdominal Pain


Referrals:  


NO PRIMARY,CARE PHYSICIAN (PCP)











DICK CARR DO Nov 7, 2017 16:29

## 2017-12-13 ENCOUNTER — HOSPITAL ENCOUNTER (EMERGENCY)
Dept: HOSPITAL 10 - E/R | Age: 42
Discharge: ANOTHER HOSPITAL | End: 2017-12-13
Payer: MEDICAID

## 2017-12-13 VITALS — HEART RATE: 106 BPM | SYSTOLIC BLOOD PRESSURE: 143 MMHG | DIASTOLIC BLOOD PRESSURE: 100 MMHG | RESPIRATION RATE: 21 BRPM

## 2017-12-13 VITALS
HEIGHT: 72 IN | HEIGHT: 72 IN | WEIGHT: 149.25 LBS | BODY MASS INDEX: 20.22 KG/M2 | BODY MASS INDEX: 20.22 KG/M2 | WEIGHT: 149.25 LBS

## 2017-12-13 DIAGNOSIS — R40.2362: ICD-10-CM

## 2017-12-13 DIAGNOSIS — R40.2252: ICD-10-CM

## 2017-12-13 DIAGNOSIS — R00.2: ICD-10-CM

## 2017-12-13 DIAGNOSIS — F17.210: ICD-10-CM

## 2017-12-13 DIAGNOSIS — I63.9: Primary | ICD-10-CM

## 2017-12-13 DIAGNOSIS — R40.2142: ICD-10-CM

## 2017-12-13 LAB
ANION GAP SERPL CALC-SCNC: 19 MMOL/L (ref 8–16)
APTT BLD: 26.5 SEC (ref 25–35)
BASOPHILS # BLD AUTO: 0.1 10^3/UL (ref 0–0.1)
BASOPHILS NFR BLD: 0.6 % (ref 0–2)
BUN SERPL-MCNC: 7 MG/DL (ref 7–20)
CALCIUM SERPL-MCNC: 9 MG/DL (ref 8.4–10.2)
CHLORIDE SERPL-SCNC: 95 MMOL/L (ref 97–110)
CO2 SERPL-SCNC: 28 MMOL/L (ref 21–31)
CREAT SERPL-MCNC: 0.68 MG/DL (ref 0.61–1.24)
EOSINOPHIL # BLD: 0.1 10^3/UL (ref 0–0.5)
EOSINOPHIL NFR BLD: 0.8 % (ref 0–7)
ERYTHROCYTE [DISTWIDTH] IN BLOOD BY AUTOMATED COUNT: 11.8 % (ref 11.5–14.5)
GLUCOSE SERPL-MCNC: 144 MG/DL (ref 70–220)
HCT VFR BLD CALC: 47.3 % (ref 42–52)
HGB BLD-MCNC: 16.7 G/DL (ref 14–18)
INR PPP: 0.91
LYMPHOCYTES # BLD AUTO: 4.1 10^3/UL (ref 0.8–2.9)
LYMPHOCYTES NFR BLD AUTO: 49.1 % (ref 15–51)
MCH RBC QN AUTO: 30.6 PG (ref 29–33)
MCHC RBC AUTO-ENTMCNC: 35.3 G/DL (ref 32–37)
MCV RBC AUTO: 86.6 FL (ref 82–101)
MONOCYTES # BLD: 0.6 10^3/UL (ref 0.3–0.9)
MONOCYTES NFR BLD: 6.8 % (ref 0–11)
NEUTROPHILS # BLD: 3.5 10^3/UL (ref 1.6–7.5)
NEUTROPHILS NFR BLD AUTO: 42.6 % (ref 39–77)
NRBC # BLD MANUAL: 0 10^3/UL (ref 0–0)
NRBC BLD AUTO-RTO: 0 /100WBC (ref 0–0)
PLATELET # BLD: 304 10^3/UL (ref 140–415)
PMV BLD AUTO: 9.6 FL (ref 7.4–10.4)
POTASSIUM SERPL-SCNC: 3.4 MMOL/L (ref 3.5–5.1)
PROTHROMBIN TIME: 12.3 SEC (ref 11.9–14.9)
PT RATIO: 1
RBC # BLD AUTO: 5.46 10^6/UL (ref 4.7–6.1)
SODIUM SERPL-SCNC: 139 MMOL/L (ref 135–144)
TROPONIN I SERPL-MCNC: < 0.012 NG/ML (ref 0–0.12)
WBC # BLD AUTO: 8.3 10^3/UL (ref 4.8–10.8)

## 2017-12-13 PROCEDURE — 84484 ASSAY OF TROPONIN QUANT: CPT

## 2017-12-13 PROCEDURE — 71010: CPT

## 2017-12-13 PROCEDURE — 83036 HEMOGLOBIN GLYCOSYLATED A1C: CPT

## 2017-12-13 PROCEDURE — 80048 BASIC METABOLIC PNL TOTAL CA: CPT

## 2017-12-13 PROCEDURE — 70450 CT HEAD/BRAIN W/O DYE: CPT

## 2017-12-13 PROCEDURE — 85730 THROMBOPLASTIN TIME PARTIAL: CPT

## 2017-12-13 PROCEDURE — 85025 COMPLETE CBC W/AUTO DIFF WBC: CPT

## 2017-12-13 PROCEDURE — 93005 ELECTROCARDIOGRAM TRACING: CPT

## 2017-12-13 PROCEDURE — 96374 THER/PROPH/DIAG INJ IV PUSH: CPT

## 2017-12-13 PROCEDURE — 96375 TX/PRO/DX INJ NEW DRUG ADDON: CPT

## 2017-12-13 PROCEDURE — 36415 COLL VENOUS BLD VENIPUNCTURE: CPT

## 2017-12-13 PROCEDURE — 85610 PROTHROMBIN TIME: CPT

## 2017-12-13 PROCEDURE — 37195 THROMBOLYTIC THERAPY STROKE: CPT

## 2017-12-13 PROCEDURE — 82962 GLUCOSE BLOOD TEST: CPT

## 2017-12-13 NOTE — STROKE
Date/Time of Note


Date/Time of Note


DATE: 12/13/17 


TIME: 21:57





Patient Information


General


Patient location:  emergency


Arrival Date





Onset Time:  18:00


Age


42


Gender


male


Weight


67.7 kg





POC Glucose


Glucose Result





Bedside Glucose - 72 Hours








Test


  12/13/17


19:16


 


Bedside Glucose


  139mg/dL


()











Vital Signs


Vital Signs





 Vital Signs








  Date Time  Temp Pulse Resp B/P Pulse Ox O2 Delivery O2 Flow Rate FiO2


 


12/13/17 18:46 98.6 127 20 165/90 97   











Patient History


Current Medications


Allergies:  


Coded Allergies:  


     sulfamethoxazole (Verified  Allergy, Mild, HIVES, 12/13/17)


     trimethoprim (Verified  Allergy, Mild, HIVES, 12/13/17)





Labs


Hematology Labs





 Hematology








Test


  12/13/17


19:20


 


White Blood Count


  8.310^3/ul


(4.8-10.8)


 


Red Blood Count


  5.4610^6/ul


(4.70-6.10)


 


Hemoglobin


  16.7g/dl


(14.0-18.0)


 


Hematocrit


  47.3%


(42.0-52.0)


 


Mean Corpuscular Volume


  86.6fl


(82.0-101.0)


 


Mean Corpuscular Hemoglobin


  30.6pg


(29.0-33.0)


 


Mean Corpuscular Hemoglobin


Concent 35.3g/dl


(32.0-37.0)


 


Red Cell Distribution Width


  11.8%


(11.5-14.5)


 


Platelet Count


  13267^3/UL


(140-415)


 


Mean Platelet Volume


  9.6fl


(7.4-10.4)


 


Neutrophils %


  42.6%


(39.0-77.0)


 


Lymphocytes %


  49.1%


(15.0-51.0)


 


Monocytes %


  6.8%


(0.0-11.0)


 


Eosinophils % 0.8% (0.0-7.0) 


 


Basophils % 0.6% (0.0-2.0) 


 


Nucleated Red Blood Cells %


  0.0/100WBC


(0.0-0.0)


 


Neutrophils #


  3.510^3/ul


(1.6-7.5)


 


Lymphocytes #


  4.110^3/ul


(0.8-2.9)


 


Monocytes #


  0.610^3/ul


(0.3-0.9)


 


Eosinophils #


  0.110^3/ul


(0.0-0.5)


 


Basophils #


  0.110^3/ul


(0.0-0.1)


 


Nucleated Red Blood Cells #


  0.010^3/ul


(0.0-0.0)








Chemistry Labs





 Chemistry








Test


  12/13/17


19:16 12/13/17


19:20


 


Bedside Glucose


  139mg/dL


() 


 


 


Sodium Level


  


  139mmol/L


(135-144)


 


Potassium Level


  


  3.4mmol/L


(3.5-5.1)


 


Chloride Level


  


  95mmol/L


()


 


Carbon Dioxide Level


  


  28mmol/L


(21-31)


 


Anion Gap  19 (8-16) 


 


Blood Urea Nitrogen  7mg/dl (7-20) 


 


Creatinine


  


  0.68mg/dl


(0.61-1.24)


 


Glucose Level


  


  144mg/dl


()


 


Hemoglobin A1c  5.5% (0-5.9) 


 


Calcium Level


  


  9.0mg/dl


(8.4-10.2)


 


Troponin I


  


  < 0.012ng/ml


(0.00-0.12)








Coagulation Labs:





 Coagulation








Test


  12/13/17


19:20


 


Prothrombin Time


  12.3Sec


(11.9-14.9)


 


Prothrombin Time Ratio 1.0 


 


INR International Normalized


Ratio 0.91 


 


 


Activated Partial


Thromboplast Time 26.5Sec


(25.0-35.0)











History & Physical


History of Present Illness


43 YO M LKW 1800 PST when had acute L face/arm/leg weakness. Patient was 

sitting at home alone when the symptoms started.





NIH Stroke Scale


NIH Stroke Scale


Total Score:  12


Date/Time Recorded


DATE: 12/13/17 


TIME: 21:57


Submitted By


Sumit Maldonado





t-PA


Imaging Review


Imaging Reviewed:  Yes


Date/Time Imaging Reviewed


DATE: 12/13/17 


TIME: 21:57





Imaging Findings


Right inferior parietal hyperdensity that is not blood per Radiology 

interpretation





t-PA Administration


Recommendation:  Yes


Weight


67.7 kg


Recommedation submitted by


Sumit Maldonado





Recommendations


Impression


Diagnosis


43 YO M with acute ischemic stroke with L hemiparesis/hemianesthesia presenting 

within the IV tPA window. He consents to IV tPA. He is non-compliant with 

examination but may have a left field cut thus VAN criteria positive. He will 

be transferred to [facility] [place] [facility] [[email or web address] where CTA can will be pursued to rule-out LVO.





Disposition


Transfer to [facility] [[email or web address]]





Recommendation


Impression and plans were discussed with the patient and separately, with the 

ER physician. The risks and benefits of the clot busting drug tPA were 

discussed. This included a 30% increased probability of good outcome at 3 

months compared to no treatment with tPA and a 6% greater chance of bleeding 

into the brain when compared to patients not receiving tPA. Patient's blood 

pressure will need to be <185/110 in order to be a candidate for TPA. The 

patient expressed understanding and would like to receive IV tPA. The patient 

should be admitted for evaluation according to hospital stroke order sets. This 

may include, if possible, MRI of the brain, MRA of the cervical and 

intracranial vessels, echocardiogram with bubble study, cardiac telemetry 

monitoring, PT, OT, Speech Pathology evaluations, LDL, A1c. Stroke risk factors 

should be evaluated and treated if appropriate. Vital signs and neuro checks 

should be undertaken every hour for the next 12 hours and, if the patient is 

stable without progression, every 4 hours thereafter. Venous thromboembolism 

prophylaxis should be undertaken with SCDs sleeves. No anticoagulation or 

antiplatelet agents should be given for the first 24 hours after TPA. Short-

term treatment of hypertension should be undertaken with nicardipine to 

maintain blood pressure in a range of systolic <180 systolic and diastolic <110 

as clinically appropriate. Unless contraindicated, the patient should be 

started on a statin as well as sliding scale insulin until the patients 

glucometer readings are consistently 140-180.  Repeat Head CT should be done 

within 24 hours of IV tPA administration to ensure no interval development of 

intracerebral hemorrhage.  If evidence of ICH, then emergent Neurosurgical 

consultation will be required. Consultation with a local neurologist is 

recommended.  Additionally we are getting CTA at [facility] [[email or web address]] to rule-out LVO amenable 

to mechanical thrombectomy.











SUMIT MALDONADO MD Dec 13, 2017 19:59

## 2017-12-13 NOTE — ERD
ER Documentation


Chief Complaint


Chief Complaint


left sided numbness w/ slurred speech 1 hour ago





HPI


Patient is a 43 yo male who presents with left arm and leg weakness.  he also 

has facial droop and slurred speech.  Symptoms started 1 hour prior to arrival.

  No tx as of yet.  Constant symptoms.  Started while sitting down.





ROS


All systems reviewed and are negative except as per history of present illness.





Medications


Home Meds


Discontinued Scripts


Ondansetron (Ondansetron Odt) 4 Mg Tab.rapdis, 4 MG PO Q6H Y for NAUSEA AND/OR 

VOMITING, #10 TAB


   Prov:YOLANDA CARRSTOLOS A. DO         11/7/17


Alprazolam* (Xanax*) 0.5 Mg Tab, 0.5 MG PO TID for ANXIETY, #14 TAB


   Prov:YOLANDA CARRSTOLOS ATeresa DO         11/7/17


Hydrocodone/Acetaminophen (Norco 5-325 Tablet) 1 Each Tablet, 1 TAB PO Q6H Y 

for PAIN, #7 TAB


   Prov:YOLANDA CARRSTPALOMAS A. DO         11/7/17


Hydrocodone/Acetaminophen (Norco 5-325 Tablet) 1 Each Tablet, 1 TAB PO Q6H Y 

for SEVERE PAIN LEVEL 7-10, #7 TAB


   Prov:CANDICE MARTINEZ NP         9/6/17


Ibuprofen* (Motrin*) 800 Mg Tab, 800 MG PO Q8, #30 TAB


   Prov:CANDICE MARTINEZ. JOSE         9/6/17


Pantoprazole* (Protonix*) 40 Mg Tablet.dr, 40 MG PO DAILY, #30 TAB


   Prov:TAVO RUDD MD         7/6/17


Lorazepam* (Lorazepam*) 1 Mg Tablet, 1 MG PO Q8, #10 TAB


   Prov:TAVO RUDD MD         7/6/17


Ondansetron (Ondansetron Odt) 8 Mg Tab.rapdis, 8 MG PO Q6H Y for NAUSEA AND/OR 

VOMITING, #8 TAB


   Prov:TAVO RUDD MD         7/6/17


Famotidine* (Pepcid*) 20 Mg Tablet, 20 MG PO BID for 14 Days, TAB


   Prov:KEVIN MARIE PA-C         6/16/17


Ondansetron (Ondansetron Odt) 4 Mg Tab.rapdis, 4 MG PO Q6H Y for NAUSEA AND/OR 

VOMITING, #10 TAB


   Prov:KEVIN MARIE PA-C         6/16/17


Ondansetron (Ondansetron Odt) 4 Mg Tab.rapdis, 4 MG PO Q6H Y for NAUSEA AND/OR 

VOMITING, #10 TAB


   Prov:NIEVES ROSA MD         5/25/17





Allergies


Allergies:  


Coded Allergies:  


     sulfamethoxazole (Verified  Allergy, Mild, HIVES, 12/13/17)


     trimethoprim (Verified  Allergy, Mild, HIVES, 12/13/17)





PMhx/Soc


Medical and Surgical Hx:  pt denies Medical Hx, pt denies Surgical Hx


History of Surgery:  No


Anesthesia Reaction:  No


Hx Neurological Disorder:  No


Hx Respiratory Disorders:  No


Hx Cardiac Disorders:  No


Hx Psychiatric Problems:  No


Hx Miscellaneous Medical Probl:  Yes (GERD, ALCOHOL ABUSE)


Hx Alcohol Use:  Yes


Hx Substance Use:  No


Hx Tobacco Use:  Yes (10 cig/ day)


Smoking Status:  Current every day smoker





FmHx


Family History:  No diabetes





Physical Exam


Vitals





Vital Signs








  Date Time  Temp Pulse Resp B/P Pulse Ox O2 Delivery O2 Flow Rate FiO2


 


12/13/17 20:09      Nasal Cannula 2 


 


12/13/17 19:45  104 22 161/109 98   


 


12/13/17 18:46 98.6 127 20 165/90 97   








Physical Exam


Const:  Left sided weakness


Head:   Atraumatic 


Eyes:    Normal Conjunctiva


ENT:    Normal External Ears, Nose and Mouth.


Neck:               Full range of motion..~ No meningismus.


Resp:    Clear to auscultation bilaterally


Cardio:    Regular rate and rhythm, no murmurs


Abd:    Soft, non tender, non distended. Normal bowel sounds


Skin:    No petechiae or rashes


Back:    No midline or flank tenderness


Ext:    No cyanosis, or edema


Neur:    Awake with slurred speech, left sided facial droop, and left arm and 

leg weakness


Result Diagram:  


12/13/17 1920 12/13/17 1920





Results 24 hrs





 Laboratory Tests








Test


  12/13/17


19:16 12/13/17


19:20


 


Bedside Glucose 139mg/dL  


 


White Blood Count  8.310^3/ul 


 


Red Blood Count  5.4610^6/ul 


 


Hemoglobin  16.7g/dl 


 


Hematocrit  47.3% 


 


Mean Corpuscular Volume  86.6fl 


 


Mean Corpuscular Hemoglobin  30.6pg 


 


Mean Corpuscular Hemoglobin


Concent 


  35.3g/dl 


 


 


Red Cell Distribution Width  11.8% 


 


Platelet Count  58101^3/UL 


 


Mean Platelet Volume  9.6fl 


 


Neutrophils %  42.6% 


 


Lymphocytes %  49.1% 


 


Monocytes %  6.8% 


 


Eosinophils %  0.8% 


 


Basophils %  0.6% 


 


Nucleated Red Blood Cells %  0.0/100WBC 


 


Neutrophils #  3.510^3/ul 


 


Lymphocytes #  4.110^3/ul 


 


Monocytes #  0.610^3/ul 


 


Eosinophils #  0.110^3/ul 


 


Basophils #  0.110^3/ul 


 


Nucleated Red Blood Cells #  0.010^3/ul 


 


Prothrombin Time  12.3Sec 


 


Prothrombin Time Ratio  1.0 


 


INR International Normalized


Ratio 


  0.91 


 


 


Activated Partial


Thromboplast Time 


  26.5Sec 


 


 


Sodium Level  139mmol/L 


 


Potassium Level  3.4mmol/L 


 


Chloride Level  95mmol/L 


 


Carbon Dioxide Level  28mmol/L 


 


Anion Gap  19 


 


Blood Urea Nitrogen  7mg/dl 


 


Creatinine  0.68mg/dl 


 


Glucose Level  144mg/dl 


 


Hemoglobin A1c  5.5% 


 


Calcium Level  9.0mg/dl 


 


Troponin I  < 0.012ng/ml 








 Current Medications








 Medications


  (Trade)  Dose


 Ordered  Sig/Dejon


 Route


 PRN Reason  Start Time


 Stop Time Status Last Admin


Dose Admin


 


 Alteplase,


 Recombinant


  (Activase)  6.1 mg  BOLUS OVER 1 MIN ONCE


 IV*


   12/13/17 20:00


 12/13/17 20:01 DC  


 


 


 Alteplase,


 Recombinant 54.8


 mg  54.8 mg  ISCHEMIC STROKE ONCE


 IV*


   12/13/17 20:00


 12/13/17 20:01 DC  


 


 


 Sodium Chloride


  (NS)  50 ml @ 0


 mls/hr  ONCE  STAT


 IVPB


   12/13/17 19:42


 12/13/17 19:43 DC  


 


 


 Ondansetron HCl


  (Zofran Inj)  4 mg  ONCE  STAT


 IV


   12/13/17 20:06


 12/13/17 20:07 DC 12/13/17 20:10


 











Procedures/MDM


ct brain negative per radiology for bleed.





EKG read by me shows nml rate, intervals, and no st elevations overall nml ekg.





Patient is a 43 yo male with acute stroke.  Arrival was at 18:43, the patient 

didn't get to a room until 19:13 when I called a Code Stroke.  Teleneuro called 

19:15.  To CT at 19:17.  Spoke with Dr. Cuenca Teleneuro at 19:23.  19:26 

radiology called and said no bleed.  19:37 Dr. Cuenca at [facility] for eval and at 

19:43 was still evaluating.  19:49 TPA was given by nursing.  19:53 call to [facility] 

for higher level of care transfer and at 19:56 I spoke with Dr. Dill at [facility].  

At 20:31 still awaiting acceptance from [facility].  





Critcal care 45 minutes excluding all billable procedures.





Departure


Diagnosis:  


 Primary Impression:  


 Stroke


 CVA mechanism:  unspecified  Qualified Code:  I63.9 - Cerebrovascular accident 

(CVA), unspecified mechanism


Condition:  Critical











ADIA SANZ MD Dec 13, 2017 20:22

## 2017-12-13 NOTE — RADRPT
PROCEDURE:   Chest x-ray 

 

CLINICAL INDICATION:   Stroke

 

TECHNIQUE:   Chest single view

 

COMPARISON:   03/27/2017 

 

FINDINGS:

The heart is normal in size.  The pulmonary vessels are normal in caliber.  The lungs are clear.  Th
e costophrenic angles are sharp.  The visualized bony thorax is unremarkable. 

 

IMPRESSION:

 

No acute cardiopulmonary disease. 

 

 

RPTAT: HH

_____________________________________________ 

.Castillo Monzon MD, MD           Date    Time 

Electronically viewed and signed by .Castillo Monzon MD, MD on 12/13/2017 19:43 

 

D:  12/13/2017 19:43  T:  12/13/2017 19:43

.W/

## 2017-12-13 NOTE — RADRPT
PROCEDURE:   CT Brain without contrast. 

 

CLINICAL INDICATION:  Code stroke, headache  

 

TECHNIQUE:   A CT of the brain was performed utilizing axial imaging from the skull base through the
 vertex without IV contrast.  Multiplanar reformatted images were made.  Images were reviewed on a [employer] workstation.  The CTDIvol is 45.01 mGy and the DLP is 720.23 mGycm.

 

One or more the following dose reduction techniques were utilized: Automated exposure control, adjus
tment of the mA and / or kV according to patient's size, or use of iterative reconstruction techniqu
e.  

 

DICOM images are available.

 

COMPARISON:   None 

 

FINDINGS:

There is no intracranial hemorrhage, mass effect, or midline shift.  No extra-axial fluid collection
 is seen. The ventricles and sulci are normal in size and configuration. The density of the brain is
 normal, and the gray white matter differentiation appears well-preserved. No skull fracture seen. T
here is appearance of fluid in the visualized upper right maxillary sinus. Mucosal thickening in rig
ht maxillary and ethmoid sinuses.

 

IMPRESSION:

 

No acute bleed. No acute major territory infarct seen. Fluid in visualized upper right maxillary sin
us. Critical result discussed with Dr. Sanon at 07:26 p.m. on 12/13/2017.

 

 

RPTAT: HJES

_____________________________________________ 

.Steve Edward MD, MD           Date    Time 

Electronically viewed and signed by .Steve Edward MD, MD on 12/13/2017 19:30 

 

D:  12/13/2017 19:30  T:  12/13/2017 19:30

.S/

## 2018-01-17 ENCOUNTER — HOSPITAL ENCOUNTER (INPATIENT)
Age: 43
LOS: 3 days | Discharge: HOME | DRG: 379 | End: 2018-01-20

## 2018-01-17 ENCOUNTER — HOSPITAL ENCOUNTER (INPATIENT)
Dept: HOSPITAL 91 - TEL | Age: 43
LOS: 3 days | Discharge: HOME | DRG: 379 | End: 2018-01-20
Payer: MEDICAID

## 2018-01-17 DIAGNOSIS — K21.0: ICD-10-CM

## 2018-01-17 DIAGNOSIS — F10.20: ICD-10-CM

## 2018-01-17 DIAGNOSIS — K92.0: Primary | ICD-10-CM

## 2018-01-17 DIAGNOSIS — K29.70: ICD-10-CM

## 2018-01-17 DIAGNOSIS — F17.210: ICD-10-CM

## 2018-01-17 DIAGNOSIS — K92.1: ICD-10-CM

## 2018-01-17 LAB
ADD MAN DIFF?: NO
ALANINE AMINOTRANSFERASE: 37 IU/L (ref 13–69)
ALBUMIN/GLOBULIN RATIO: 1.5
ALBUMIN: 4.5 G/DL (ref 3.3–4.9)
ALKALINE PHOSPHATASE: 109 IU/L (ref 42–121)
ANION GAP: 18 (ref 8–16)
ASPARTATE AMINO TRANSFERASE: 36 IU/L (ref 15–46)
BASOPHIL #: 0.1 10^3/UL (ref 0–0.1)
BASOPHILS %: 0.7 % (ref 0–2)
BILIRUBIN,DIRECT: 0 MG/DL (ref 0–0.2)
BILIRUBIN,TOTAL: 1.6 MG/DL (ref 0.2–1.3)
BLOOD UREA NITROGEN: 6 MG/DL (ref 7–20)
CALCIUM: 8.7 MG/DL (ref 8.4–10.2)
CARBON DIOXIDE: 25 MMOL/L (ref 21–31)
CHLORIDE: 100 MMOL/L (ref 97–110)
CREATININE: 0.65 MG/DL (ref 0.61–1.24)
EOSINOPHILS #: 0 10^3/UL (ref 0–0.5)
EOSINOPHILS %: 0.4 % (ref 0–7)
GLOBULIN: 3 G/DL (ref 1.3–3.2)
GLUCOSE: 90 MG/DL (ref 70–220)
HEMATOCRIT: 38.8 % (ref 42–52)
HEMOGLOBIN: 13.6 G/DL (ref 14–18)
INR: 1.01
LYMPHOCYTES #: 2 10^3/UL (ref 0.8–2.9)
LYMPHOCYTES %: 26.3 % (ref 15–51)
MEAN CORPUSCULAR HEMOGLOBIN: 30.6 PG (ref 29–33)
MEAN CORPUSCULAR HGB CONC: 35.1 G/DL (ref 32–37)
MEAN CORPUSCULAR VOLUME: 87.4 FL (ref 82–101)
MEAN PLATELET VOLUME: 9.7 FL (ref 7.4–10.4)
MONOCYTE #: 0.5 10^3/UL (ref 0.3–0.9)
MONOCYTES %: 6.7 % (ref 0–11)
NEUTROPHIL #: 4.9 10^3/UL (ref 1.6–7.5)
NEUTROPHILS %: 65.6 % (ref 39–77)
NUCLEATED RED BLOOD CELLS #: 0 10^3/UL (ref 0–0)
NUCLEATED RED BLOOD CELLS%: 0 /100WBC (ref 0–0)
PARTIAL THROMBOPLASTIN TIME: 28.1 SEC (ref 25–35)
PLATELET COUNT: 239 10^3/UL (ref 140–415)
POTASSIUM: 3.2 MMOL/L (ref 3.5–5.1)
PROTIME: 13.4 SEC (ref 11.9–14.9)
PT RATIO: 1
RED BLOOD COUNT: 4.44 10^6/UL (ref 4.7–6.1)
RED CELL DISTRIBUTION WIDTH: 12.2 % (ref 11.5–14.5)
SODIUM: 140 MMOL/L (ref 135–144)
TOTAL PROTEIN: 7.5 G/DL (ref 6.1–8.1)
WHITE BLOOD COUNT: 7.5 10^3/UL (ref 4.8–10.8)

## 2018-01-17 PROCEDURE — 85730 THROMBOPLASTIN TIME PARTIAL: CPT

## 2018-01-17 PROCEDURE — 85025 COMPLETE CBC W/AUTO DIFF WBC: CPT

## 2018-01-17 PROCEDURE — 84100 ASSAY OF PHOSPHORUS: CPT

## 2018-01-17 PROCEDURE — 82962 GLUCOSE BLOOD TEST: CPT

## 2018-01-17 PROCEDURE — 80053 COMPREHEN METABOLIC PANEL: CPT

## 2018-01-17 PROCEDURE — 85610 PROTHROMBIN TIME: CPT

## 2018-01-17 PROCEDURE — 96368 THER/DIAG CONCURRENT INF: CPT

## 2018-01-17 PROCEDURE — 84439 ASSAY OF FREE THYROXINE: CPT

## 2018-01-17 PROCEDURE — 88312 SPECIAL STAINS GROUP 1: CPT

## 2018-01-17 PROCEDURE — 96376 TX/PRO/DX INJ SAME DRUG ADON: CPT

## 2018-01-17 PROCEDURE — 80061 LIPID PANEL: CPT

## 2018-01-17 PROCEDURE — 83735 ASSAY OF MAGNESIUM: CPT

## 2018-01-17 PROCEDURE — 86850 RBC ANTIBODY SCREEN: CPT

## 2018-01-17 PROCEDURE — 36415 COLL VENOUS BLD VENIPUNCTURE: CPT

## 2018-01-17 PROCEDURE — 83036 HEMOGLOBIN GLYCOSYLATED A1C: CPT

## 2018-01-17 PROCEDURE — 84443 ASSAY THYROID STIM HORMONE: CPT

## 2018-01-17 PROCEDURE — 99285 EMERGENCY DEPT VISIT HI MDM: CPT

## 2018-01-17 PROCEDURE — 80048 BASIC METABOLIC PNL TOTAL CA: CPT

## 2018-01-17 PROCEDURE — 71045 X-RAY EXAM CHEST 1 VIEW: CPT

## 2018-01-17 PROCEDURE — 96366 THER/PROPH/DIAG IV INF ADDON: CPT

## 2018-01-17 PROCEDURE — 86901 BLOOD TYPING SEROLOGIC RH(D): CPT

## 2018-01-17 PROCEDURE — 96375 TX/PRO/DX INJ NEW DRUG ADDON: CPT

## 2018-01-17 PROCEDURE — 88305 TISSUE EXAM BY PATHOLOGIST: CPT

## 2018-01-17 PROCEDURE — 86900 BLOOD TYPING SEROLOGIC ABO: CPT

## 2018-01-17 PROCEDURE — 96365 THER/PROPH/DIAG IV INF INIT: CPT

## 2018-01-17 RX ADMIN — HYDROMORPHONE HYDROCHLORIDE 1 MG: 1 INJECTION, SOLUTION INTRAMUSCULAR; INTRAVENOUS; SUBCUTANEOUS at 22:44

## 2018-01-17 RX ADMIN — OCTREOTIDE ACETATE 1 MLS/HR: 500 INJECTION, SOLUTION INTRAVENOUS; SUBCUTANEOUS at 23:50

## 2018-01-17 RX ADMIN — THIAMINE HYDROCHLORIDE 1 MLS/HR: 100 INJECTION, SOLUTION INTRAMUSCULAR; INTRAVENOUS at 22:44

## 2018-01-17 RX ADMIN — SODIUM CHLORIDE 1 MLS/HR: 9 INJECTION, SOLUTION INTRAVENOUS at 23:20

## 2018-01-17 RX ADMIN — ONDANSETRON HYDROCHLORIDE 1 MG: 2 INJECTION, SOLUTION INTRAMUSCULAR; INTRAVENOUS at 22:44

## 2018-01-17 RX ADMIN — PANTOPRAZOLE SODIUM 1 MLS/HR: 40 INJECTION, POWDER, FOR SOLUTION INTRAVENOUS at 23:50

## 2018-01-17 RX ADMIN — PANTOPRAZOLE SODIUM 1 MLS/HR: 40 INJECTION, POWDER, FOR SOLUTION INTRAVENOUS at 22:45

## 2018-01-17 RX ADMIN — THIAMINE HYDROCHLORIDE 1 MLS/HR: 100 INJECTION, SOLUTION INTRAMUSCULAR; INTRAVENOUS at 23:53

## 2018-01-18 LAB
FREE T4 (FREE THYROXINE): 0.91 NG/DL (ref 0.64–1.79)
INR: 1.02
PARTIAL THROMBOPLASTIN TIME: 30.4 SEC (ref 25–35)
PROTIME: 13.5 SEC (ref 11.9–14.9)
PT RATIO: 1.1

## 2018-01-18 RX ADMIN — POTASSIUM CHLORIDE 1 MLS/HR: 200 INJECTION, SOLUTION INTRAVENOUS at 12:27

## 2018-01-18 RX ADMIN — LORAZEPAM 1 MG: 2 INJECTION, SOLUTION INTRAMUSCULAR; INTRAVENOUS at 14:04

## 2018-01-18 RX ADMIN — THIAMINE HYDROCHLORIDE 1 MLS/HR: 100 INJECTION, SOLUTION INTRAMUSCULAR; INTRAVENOUS at 20:45

## 2018-01-18 RX ADMIN — VASOPRESSIN 1 MLS/HR: 20 INJECTION, SOLUTION INTRAMUSCULAR; SUBCUTANEOUS at 10:19

## 2018-01-18 RX ADMIN — MORPHINE SULFATE 1 MG: 2 INJECTION, SOLUTION INTRAMUSCULAR; INTRAVENOUS at 21:01

## 2018-01-18 RX ADMIN — POTASSIUM CHLORIDE 1 MLS/HR: 200 INJECTION, SOLUTION INTRAVENOUS at 02:00

## 2018-01-18 RX ADMIN — MORPHINE SULFATE 1 MG: 2 INJECTION, SOLUTION INTRAMUSCULAR; INTRAVENOUS at 10:25

## 2018-01-18 RX ADMIN — OCTREOTIDE ACETATE 1 MLS/HR: 500 INJECTION, SOLUTION INTRAVENOUS; SUBCUTANEOUS at 06:30

## 2018-01-18 RX ADMIN — THIAMINE HYDROCHLORIDE 1 MLS/HR: 100 INJECTION, SOLUTION INTRAMUSCULAR; INTRAVENOUS at 10:19

## 2018-01-18 RX ADMIN — POTASSIUM CHLORIDE 1 MLS/HR: 200 INJECTION, SOLUTION INTRAVENOUS at 05:49

## 2018-01-18 RX ADMIN — MORPHINE SULFATE 1 MG: 2 INJECTION, SOLUTION INTRAMUSCULAR; INTRAVENOUS at 06:18

## 2018-01-18 RX ADMIN — LORAZEPAM 1 MG: 2 INJECTION, SOLUTION INTRAMUSCULAR; INTRAVENOUS at 20:42

## 2018-01-18 RX ADMIN — VASOPRESSIN 1 MLS/HR: 20 INJECTION, SOLUTION INTRAMUSCULAR; SUBCUTANEOUS at 18:00

## 2018-01-18 RX ADMIN — ONDANSETRON HYDROCHLORIDE 1 MG: 2 INJECTION, SOLUTION INTRAMUSCULAR; INTRAVENOUS at 06:18

## 2018-01-18 RX ADMIN — THIAMINE HYDROCHLORIDE 1 MLS/HR: 100 INJECTION, SOLUTION INTRAMUSCULAR; INTRAVENOUS at 01:59

## 2018-01-18 RX ADMIN — THIAMINE HYDROCHLORIDE 1 MLS/HR: 100 INJECTION, SOLUTION INTRAMUSCULAR; INTRAVENOUS at 10:59

## 2018-01-19 LAB
ADD MAN DIFF?: NO
ANION GAP: 19 (ref 8–16)
BASOPHIL #: 0.1 10^3/UL (ref 0–0.1)
BASOPHILS %: 0.8 % (ref 0–2)
BLOOD UREA NITROGEN: 7 MG/DL (ref 7–20)
CALCIUM: 8.3 MG/DL (ref 8.4–10.2)
CARBON DIOXIDE: 20 MMOL/L (ref 21–31)
CHLORIDE: 104 MMOL/L (ref 97–110)
CHOL/HDL RATIO: 3.2 RATIO
CHOLESTEROL: 141 MG/DL (ref 100–200)
CREATININE: 0.69 MG/DL (ref 0.61–1.24)
EOSINOPHILS #: 0.2 10^3/UL (ref 0–0.5)
EOSINOPHILS %: 4 % (ref 0–7)
GLUCOSE: 53 MG/DL (ref 70–220)
HDL CHOLESTEROL: 43 MG/DL (ref 27–67)
HEMATOCRIT: 39 % (ref 42–52)
HEMOGLOBIN A1C: 5.4 % (ref 0–5.9)
HEMOGLOBIN: 13.2 G/DL (ref 14–18)
INR: 1.04
LDL CHOLESTEROL,CALCULATED: 77 MG/DL
LYMPHOCYTES #: 1.9 10^3/UL (ref 0.8–2.9)
LYMPHOCYTES %: 31.1 % (ref 15–51)
MAGNESIUM: 1.6 MG/DL (ref 1.7–2.5)
MEAN CORPUSCULAR HEMOGLOBIN: 30.7 PG (ref 29–33)
MEAN CORPUSCULAR HGB CONC: 33.8 G/DL (ref 32–37)
MEAN CORPUSCULAR VOLUME: 90.7 FL (ref 82–101)
MEAN PLATELET VOLUME: 10.4 FL (ref 7.4–10.4)
MONOCYTE #: 0.4 10^3/UL (ref 0.3–0.9)
MONOCYTES %: 6.6 % (ref 0–11)
NEUTROPHIL #: 3.4 10^3/UL (ref 1.6–7.5)
NEUTROPHILS %: 57.3 % (ref 39–77)
NUCLEATED RED BLOOD CELLS #: 0 10^3/UL (ref 0–0)
NUCLEATED RED BLOOD CELLS%: 0 /100WBC (ref 0–0)
PARTIAL THROMBOPLASTIN TIME: 33 SEC (ref 25–35)
PHOSPHORUS: 3 MG/DL (ref 2.5–4.9)
PLATELET COUNT: 193 10^3/UL (ref 140–415)
POTASSIUM: 3.8 MMOL/L (ref 3.5–5.1)
PROTIME: 13.7 SEC (ref 11.9–14.9)
PT RATIO: 1.1
RED BLOOD COUNT: 4.3 10^6/UL (ref 4.7–6.1)
RED CELL DISTRIBUTION WIDTH: 12.3 % (ref 11.5–14.5)
SODIUM: 139 MMOL/L (ref 135–144)
THYROID STIMULATING HORMONE: 0.2 MIU/L (ref 0.47–4.68)
TRIGLYCERIDES: 106 MG/DL (ref 0–149)
WHITE BLOOD COUNT: 6 10^3/UL (ref 4.8–10.8)

## 2018-01-19 PROCEDURE — 0DB68ZX EXCISION OF STOMACH, VIA NATURAL OR ARTIFICIAL OPENING ENDOSCOPIC, DIAGNOSTIC: ICD-10-PCS

## 2018-01-19 RX ADMIN — THIAMINE HYDROCHLORIDE 1 MLS/HR: 100 INJECTION, SOLUTION INTRAMUSCULAR; INTRAVENOUS at 08:13

## 2018-01-19 RX ADMIN — MORPHINE SULFATE 1 MG: 2 INJECTION, SOLUTION INTRAMUSCULAR; INTRAVENOUS at 07:36

## 2018-01-19 RX ADMIN — OCTREOTIDE ACETATE 1 MLS/HR: 500 INJECTION, SOLUTION INTRAVENOUS; SUBCUTANEOUS at 02:15

## 2018-01-19 RX ADMIN — LORAZEPAM 1 MG: 2 INJECTION, SOLUTION INTRAMUSCULAR; INTRAVENOUS at 08:34

## 2018-01-19 RX ADMIN — VASOPRESSIN 1 MLS/HR: 20 INJECTION, SOLUTION INTRAMUSCULAR; SUBCUTANEOUS at 04:37

## 2018-01-19 RX ADMIN — VASOPRESSIN 1 MLS/HR: 20 INJECTION, SOLUTION INTRAMUSCULAR; SUBCUTANEOUS at 14:26

## 2018-01-19 RX ADMIN — MORPHINE SULFATE 1 MG: 2 INJECTION, SOLUTION INTRAMUSCULAR; INTRAVENOUS at 12:21

## 2018-01-19 RX ADMIN — MORPHINE SULFATE 1 MG: 2 INJECTION, SOLUTION INTRAMUSCULAR; INTRAVENOUS at 22:00

## 2018-01-19 RX ADMIN — HYDROCODONE BITARTRATE AND ACETAMINOPHEN 1 TAB: 5; 325 TABLET ORAL at 18:42

## 2018-01-19 RX ADMIN — MIDAZOLAM HYDROCHLORIDE 1 MG: 2 INJECTION, SOLUTION INTRAMUSCULAR; INTRAVENOUS at 20:00

## 2018-01-19 RX ADMIN — ASCORBIC ACID, VITAMIN A PALMITATE, CHOLECALCIFEROL, THIAMINE HYDROCHLORIDE, RIBOFLAVIN-5 PHOSPHATE SODIUM, PYRIDOXINE HYDROCHLORIDE, NIACINAMIDE, DEXPANTHENOL, ALPHA-TOCOPHEROL ACETATE, VITAMIN K1, FOLIC ACID, BIOTIN, CYANOCOBALAMIN 1 MLS/HR: 200; 3300; 200; 6; 3.6; 6; 40; 15; 10; 150; 600; 60; 5 INJECTION, SOLUTION INTRAVENOUS at 10:33

## 2018-01-19 RX ADMIN — LIDOCAINE HYDROCHLORIDE 1 MG: 20 INJECTION, SOLUTION INTRAVENOUS at 20:00

## 2018-01-19 RX ADMIN — PROPOFOL 1: 10 INJECTION, EMULSION INTRAVENOUS at 20:00

## 2018-01-19 RX ADMIN — THIAMINE HYDROCHLORIDE 1 MLS/HR: 100 INJECTION, SOLUTION INTRAMUSCULAR; INTRAVENOUS at 17:53

## 2018-01-19 RX ADMIN — THIAMINE HYDROCHLORIDE 1 MLS/HR: 100 INJECTION, SOLUTION INTRAMUSCULAR; INTRAVENOUS at 16:45

## 2018-01-19 RX ADMIN — THIAMINE HYDROCHLORIDE 1 MLS/HR: 100 INJECTION, SOLUTION INTRAMUSCULAR; INTRAVENOUS at 04:37

## 2018-01-20 LAB
ADD MAN DIFF?: NO
ANION GAP: 14 (ref 8–16)
BASOPHIL #: 0 10^3/UL (ref 0–0.1)
BASOPHILS %: 0.7 % (ref 0–2)
BLOOD UREA NITROGEN: 6 MG/DL (ref 7–20)
CALCIUM: 8.2 MG/DL (ref 8.4–10.2)
CARBON DIOXIDE: 27 MMOL/L (ref 21–31)
CHLORIDE: 103 MMOL/L (ref 97–110)
CREATININE: 0.7 MG/DL (ref 0.61–1.24)
EOSINOPHILS #: 0.3 10^3/UL (ref 0–0.5)
EOSINOPHILS %: 5.6 % (ref 0–7)
GLUCOSE: 89 MG/DL (ref 70–220)
HEMATOCRIT: 37 % (ref 42–52)
HEMOGLOBIN: 13.1 G/DL (ref 14–18)
LYMPHOCYTES #: 2.8 10^3/UL (ref 0.8–2.9)
LYMPHOCYTES %: 46.3 % (ref 15–51)
MAGNESIUM: 1.6 MG/DL (ref 1.7–2.5)
MEAN CORPUSCULAR HEMOGLOBIN: 31.2 PG (ref 29–33)
MEAN CORPUSCULAR HGB CONC: 35.4 G/DL (ref 32–37)
MEAN CORPUSCULAR VOLUME: 88.1 FL (ref 82–101)
MEAN PLATELET VOLUME: 10.7 FL (ref 7.4–10.4)
MONOCYTE #: 0.4 10^3/UL (ref 0.3–0.9)
MONOCYTES %: 6.5 % (ref 0–11)
NEUTROPHIL #: 2.5 10^3/UL (ref 1.6–7.5)
NEUTROPHILS %: 40.7 % (ref 39–77)
NUCLEATED RED BLOOD CELLS #: 0 10^3/UL (ref 0–0)
NUCLEATED RED BLOOD CELLS%: 0 /100WBC (ref 0–0)
PLATELET COUNT: 193 10^3/UL (ref 140–415)
POTASSIUM: 3.3 MMOL/L (ref 3.5–5.1)
RED BLOOD COUNT: 4.2 10^6/UL (ref 4.7–6.1)
RED CELL DISTRIBUTION WIDTH: 12 % (ref 11.5–14.5)
SODIUM: 141 MMOL/L (ref 135–144)
WHITE BLOOD COUNT: 6 10^3/UL (ref 4.8–10.8)

## 2018-01-20 RX ADMIN — POTASSIUM CHLORIDE 1 MEQ: 1500 TABLET, EXTENDED RELEASE ORAL at 12:20

## 2018-01-20 RX ADMIN — THIAMINE HYDROCHLORIDE 1 MLS/HR: 100 INJECTION, SOLUTION INTRAMUSCULAR; INTRAVENOUS at 12:22

## 2018-01-20 RX ADMIN — THIAMINE HYDROCHLORIDE 1 MLS/HR: 100 INJECTION, SOLUTION INTRAMUSCULAR; INTRAVENOUS at 09:49

## 2018-01-20 RX ADMIN — MORPHINE SULFATE 1 MG: 2 INJECTION, SOLUTION INTRAMUSCULAR; INTRAVENOUS at 11:06

## 2018-01-20 RX ADMIN — THIAMINE HYDROCHLORIDE 1 MLS/HR: 100 INJECTION, SOLUTION INTRAMUSCULAR; INTRAVENOUS at 03:15

## 2018-01-20 RX ADMIN — PANTOPRAZOLE SODIUM 1 MG: 40 TABLET, DELAYED RELEASE ORAL at 05:12

## 2018-03-14 ENCOUNTER — HOSPITAL ENCOUNTER (EMERGENCY)
Age: 43
Discharge: HOME | End: 2018-03-14

## 2018-03-18 ENCOUNTER — HOSPITAL ENCOUNTER (EMERGENCY)
Age: 43
Discharge: HOME | End: 2018-03-18

## 2018-03-18 ENCOUNTER — HOSPITAL ENCOUNTER (EMERGENCY)
Dept: HOSPITAL 91 - FTE | Age: 43
Discharge: HOME | End: 2018-03-18
Payer: MEDICAID

## 2018-03-18 DIAGNOSIS — R10.13: Primary | ICD-10-CM

## 2018-03-18 DIAGNOSIS — R11.2: ICD-10-CM

## 2018-03-18 DIAGNOSIS — F17.210: ICD-10-CM

## 2018-03-18 LAB
ADD MAN DIFF?: NO
ALANINE AMINOTRANSFERASE: 32 IU/L (ref 13–69)
ALBUMIN/GLOBULIN RATIO: 1.35
ALBUMIN: 4.6 G/DL (ref 3.3–4.9)
ALKALINE PHOSPHATASE: 86 IU/L (ref 42–121)
ANION GAP: 27 (ref 8–16)
ASPARTATE AMINO TRANSFERASE: 35 IU/L (ref 15–46)
BASOPHIL #: 0 10^3/UL (ref 0–0.1)
BASOPHILS %: 0.6 % (ref 0–2)
BILIRUBIN,DIRECT: 0 MG/DL (ref 0–0.2)
BILIRUBIN,TOTAL: 0.2 MG/DL (ref 0.2–1.3)
BLOOD UREA NITROGEN: 3 MG/DL (ref 7–20)
CALCIUM: 9.1 MG/DL (ref 8.4–10.2)
CARBON DIOXIDE: 23 MMOL/L (ref 21–31)
CHLORIDE: 105 MMOL/L (ref 97–110)
CREATININE: 0.65 MG/DL (ref 0.61–1.24)
EOSINOPHILS #: 0.1 10^3/UL (ref 0–0.5)
EOSINOPHILS %: 1.4 % (ref 0–7)
ETHANOL: 216 MG/DL
GLOBULIN: 3.4 G/DL (ref 1.3–3.2)
GLUCOSE: 100 MG/DL (ref 70–220)
HEMATOCRIT: 44.5 % (ref 42–52)
HEMOGLOBIN: 15.9 G/DL (ref 14–18)
LIPASE: 129 U/L (ref 23–300)
LYMPHOCYTES #: 3.2 10^3/UL (ref 0.8–2.9)
LYMPHOCYTES %: 50.8 % (ref 15–51)
MEAN CORPUSCULAR HEMOGLOBIN: 30.8 PG (ref 29–33)
MEAN CORPUSCULAR HGB CONC: 35.7 G/DL (ref 32–37)
MEAN CORPUSCULAR VOLUME: 86.1 FL (ref 82–101)
MEAN PLATELET VOLUME: 9.8 FL (ref 7.4–10.4)
MONOCYTE #: 0.4 10^3/UL (ref 0.3–0.9)
MONOCYTES %: 6.7 % (ref 0–11)
NEUTROPHIL #: 2.5 10^3/UL (ref 1.6–7.5)
NEUTROPHILS %: 40.3 % (ref 39–77)
NUCLEATED RED BLOOD CELLS #: 0 10^3/UL (ref 0–0)
NUCLEATED RED BLOOD CELLS%: 0 /100WBC (ref 0–0)
PLATELET COUNT: 264 10^3/UL (ref 140–415)
POTASSIUM: 3.9 MMOL/L (ref 3.5–5.1)
RED BLOOD COUNT: 5.17 10^6/UL (ref 4.7–6.1)
RED CELL DISTRIBUTION WIDTH: 11.9 % (ref 11.5–14.5)
SODIUM: 151 MMOL/L (ref 135–144)
TOTAL PROTEIN: 8 G/DL (ref 6.1–8.1)
WHITE BLOOD COUNT: 6.3 10^3/UL (ref 4.8–10.8)

## 2018-03-18 PROCEDURE — 83690 ASSAY OF LIPASE: CPT

## 2018-03-18 PROCEDURE — 80306 DRUG TEST PRSMV INSTRMNT: CPT

## 2018-03-18 PROCEDURE — 96375 TX/PRO/DX INJ NEW DRUG ADDON: CPT

## 2018-03-18 PROCEDURE — 99284 EMERGENCY DEPT VISIT MOD MDM: CPT

## 2018-03-18 PROCEDURE — 80053 COMPREHEN METABOLIC PANEL: CPT

## 2018-03-18 PROCEDURE — 96374 THER/PROPH/DIAG INJ IV PUSH: CPT

## 2018-03-18 PROCEDURE — 85025 COMPLETE CBC W/AUTO DIFF WBC: CPT

## 2018-03-18 RX ADMIN — LORAZEPAM 1 MG: 2 INJECTION, SOLUTION INTRAMUSCULAR; INTRAVENOUS at 09:42

## 2018-03-18 RX ADMIN — ASCORBIC ACID, VITAMIN A PALMITATE, CHOLECALCIFEROL, THIAMINE HYDROCHLORIDE, RIBOFLAVIN-5 PHOSPHATE SODIUM, PYRIDOXINE HYDROCHLORIDE, NIACINAMIDE, DEXPANTHENOL, ALPHA-TOCOPHEROL ACETATE, VITAMIN K1, FOLIC ACID, BIOTIN, CYANOCOBALAMIN 1 MLS/HR: 200; 3300; 200; 6; 3.6; 6; 40; 15; 10; 150; 600; 60; 5 INJECTION, SOLUTION INTRAVENOUS at 08:58

## 2018-03-18 RX ADMIN — ALUMINUM HYDROXIDE, MAGNESIUM HYDROXIDE, DIMETHICONE 1 ML: 200; 200; 20 SUSPENSION ORAL at 08:58

## 2018-03-18 RX ADMIN — ONDANSETRON HYDROCHLORIDE 1 MG: 2 INJECTION, SOLUTION INTRAMUSCULAR; INTRAVENOUS at 08:58

## 2018-03-18 RX ADMIN — HYDROCODONE BITARTRATE AND ACETAMINOPHEN 1 TAB: 5; 325 TABLET ORAL at 10:38

## 2018-05-29 ENCOUNTER — HOSPITAL ENCOUNTER (EMERGENCY)
Age: 43
LOS: 1 days | Discharge: HOME | End: 2018-05-30

## 2018-05-29 ENCOUNTER — HOSPITAL ENCOUNTER (EMERGENCY)
Dept: HOSPITAL 91 - E/R | Age: 43
LOS: 1 days | Discharge: HOME | End: 2018-05-30
Payer: MEDICAID

## 2018-05-29 DIAGNOSIS — K92.2: Primary | ICD-10-CM

## 2018-05-29 DIAGNOSIS — F10.10: ICD-10-CM

## 2018-05-29 LAB
ADD MAN DIFF?: NO
ALANINE AMINOTRANSFERASE: 43 IU/L (ref 13–69)
ALBUMIN/GLOBULIN RATIO: 1.17
ALBUMIN: 4.8 G/DL (ref 3.3–4.9)
ALKALINE PHOSPHATASE: 107 IU/L (ref 42–121)
ANION GAP: 24 (ref 8–16)
ASPARTATE AMINO TRANSFERASE: 57 IU/L (ref 15–46)
BASOPHIL #: 0 10^3/UL (ref 0–0.1)
BASOPHILS %: 0.6 % (ref 0–2)
BILIRUBIN,DIRECT: 0 MG/DL (ref 0–0.2)
BILIRUBIN,TOTAL: 0.5 MG/DL (ref 0.2–1.3)
BLOOD UREA NITROGEN: 7 MG/DL (ref 7–20)
CALCIUM: 9.3 MG/DL (ref 8.4–10.2)
CARBON DIOXIDE: 24 MMOL/L (ref 21–31)
CHLORIDE: 103 MMOL/L (ref 97–110)
CREATININE: 0.72 MG/DL (ref 0.61–1.24)
EOSINOPHILS #: 0 10^3/UL (ref 0–0.5)
EOSINOPHILS %: 0.4 % (ref 0–7)
ETHANOL: 267 MG/DL
GLOBULIN: 4.1 G/DL (ref 1.3–3.2)
GLUCOSE: 116 MG/DL (ref 70–220)
HEMATOCRIT: 49.6 % (ref 42–52)
HEMOGLOBIN: 17.5 G/DL (ref 14–18)
INR: 0.95
LYMPHOCYTES #: 3.3 10^3/UL (ref 0.8–2.9)
LYMPHOCYTES %: 46.9 % (ref 15–51)
MEAN CORPUSCULAR HEMOGLOBIN: 30.3 PG (ref 29–33)
MEAN CORPUSCULAR HGB CONC: 35.3 G/DL (ref 32–37)
MEAN CORPUSCULAR VOLUME: 85.8 FL (ref 82–101)
MEAN PLATELET VOLUME: 9.9 FL (ref 7.4–10.4)
MONOCYTE #: 0.5 10^3/UL (ref 0.3–0.9)
MONOCYTES %: 7.2 % (ref 0–11)
NEUTROPHIL #: 3.2 10^3/UL (ref 1.6–7.5)
NEUTROPHILS %: 44.8 % (ref 39–77)
NUCLEATED RED BLOOD CELLS #: 0 10^3/UL (ref 0–0)
NUCLEATED RED BLOOD CELLS%: 0 /100WBC (ref 0–0)
PARTIAL THROMBOPLASTIN TIME: 27.2 SEC (ref 25–35)
PLATELET COUNT: 299 10^3/UL (ref 140–415)
POTASSIUM: 3.7 MMOL/L (ref 3.5–5.1)
PROTIME: 12.8 SEC (ref 11.9–14.9)
PT RATIO: 1
RED BLOOD COUNT: 5.78 10^6/UL (ref 4.7–6.1)
RED CELL DISTRIBUTION WIDTH: 12.6 % (ref 11.5–14.5)
SODIUM: 147 MMOL/L (ref 135–144)
TOTAL PROTEIN: 8.9 G/DL (ref 6.1–8.1)
WHITE BLOOD COUNT: 7.1 10^3/UL (ref 4.8–10.8)

## 2018-05-29 PROCEDURE — 86900 BLOOD TYPING SEROLOGIC ABO: CPT

## 2018-05-29 PROCEDURE — 85610 PROTHROMBIN TIME: CPT

## 2018-05-29 PROCEDURE — 93005 ELECTROCARDIOGRAM TRACING: CPT

## 2018-05-29 PROCEDURE — 86850 RBC ANTIBODY SCREEN: CPT

## 2018-05-29 PROCEDURE — 96375 TX/PRO/DX INJ NEW DRUG ADDON: CPT

## 2018-05-29 PROCEDURE — 99285 EMERGENCY DEPT VISIT HI MDM: CPT

## 2018-05-29 PROCEDURE — 80307 DRUG TEST PRSMV CHEM ANLYZR: CPT

## 2018-05-29 PROCEDURE — 96374 THER/PROPH/DIAG INJ IV PUSH: CPT

## 2018-05-29 PROCEDURE — 80053 COMPREHEN METABOLIC PANEL: CPT

## 2018-05-29 PROCEDURE — 85025 COMPLETE CBC W/AUTO DIFF WBC: CPT

## 2018-05-29 PROCEDURE — 74177 CT ABD & PELVIS W/CONTRAST: CPT

## 2018-05-29 PROCEDURE — 85730 THROMBOPLASTIN TIME PARTIAL: CPT

## 2018-05-29 PROCEDURE — 36415 COLL VENOUS BLD VENIPUNCTURE: CPT

## 2018-05-29 PROCEDURE — 86901 BLOOD TYPING SEROLOGIC RH(D): CPT

## 2018-05-29 RX ADMIN — HYDROMORPHONE HYDROCHLORIDE 1 MG: 2 INJECTION INTRAMUSCULAR; INTRAVENOUS; SUBCUTANEOUS at 23:07

## 2018-05-29 RX ADMIN — ONDANSETRON HYDROCHLORIDE 1 MG: 2 INJECTION, SOLUTION INTRAMUSCULAR; INTRAVENOUS at 23:07

## 2018-05-29 RX ADMIN — THIAMINE HYDROCHLORIDE 1 MLS/HR: 100 INJECTION, SOLUTION INTRAMUSCULAR; INTRAVENOUS at 22:07

## 2018-05-29 RX ADMIN — VASOPRESSIN 1 ML/SEC: 20 INJECTION, SOLUTION INTRAMUSCULAR; SUBCUTANEOUS at 23:59

## 2018-05-29 RX ADMIN — PANTOPRAZOLE SODIUM 1 MLS/HR: 40 INJECTION, POWDER, FOR SOLUTION INTRAVENOUS at 22:07

## 2018-05-29 RX ADMIN — HYDROMORPHONE HYDROCHLORIDE 1 MG: 1 INJECTION, SOLUTION INTRAMUSCULAR; INTRAVENOUS; SUBCUTANEOUS at 23:31

## 2018-05-29 RX ADMIN — IOHEXOL 1 ML: 300 INJECTION, SOLUTION INTRAVENOUS at 23:59

## 2018-05-29 RX ADMIN — ONDANSETRON HYDROCHLORIDE 1 MG: 2 INJECTION, SOLUTION INTRAMUSCULAR; INTRAVENOUS at 22:30

## 2018-05-30 RX ADMIN — THIAMINE HYDROCHLORIDE 1 MLS/HR: 100 INJECTION, SOLUTION INTRAMUSCULAR; INTRAVENOUS at 02:40

## 2018-05-30 RX ADMIN — THIAMINE HYDROCHLORIDE 1 MLS/HR: 100 INJECTION, SOLUTION INTRAMUSCULAR; INTRAVENOUS at 00:47

## 2018-06-13 ENCOUNTER — HOSPITAL ENCOUNTER (EMERGENCY)
Dept: HOSPITAL 91 - E/R | Age: 43
Discharge: HOME | End: 2018-06-13
Payer: MEDICAID

## 2018-06-13 ENCOUNTER — HOSPITAL ENCOUNTER (EMERGENCY)
Age: 43
Discharge: HOME | End: 2018-06-13

## 2018-06-13 DIAGNOSIS — R19.7: ICD-10-CM

## 2018-06-13 DIAGNOSIS — F10.10: Primary | ICD-10-CM

## 2018-06-13 DIAGNOSIS — Z87.891: ICD-10-CM

## 2018-06-13 DIAGNOSIS — R40.2362: ICD-10-CM

## 2018-06-13 DIAGNOSIS — R00.0: ICD-10-CM

## 2018-06-13 DIAGNOSIS — K29.20: ICD-10-CM

## 2018-06-13 DIAGNOSIS — R40.2142: ICD-10-CM

## 2018-06-13 LAB
ADD MAN DIFF?: NO
ADD UMIC: NO
ALANINE AMINOTRANSFERASE: 27 IU/L (ref 13–69)
ALBUMIN/GLOBULIN RATIO: 1.3
ALBUMIN: 4.7 G/DL (ref 3.3–4.9)
ALKALINE PHOSPHATASE: 111 IU/L (ref 42–121)
ANION GAP: 19 (ref 8–16)
ASPARTATE AMINO TRANSFERASE: 28 IU/L (ref 15–46)
BASOPHIL #: 0.1 10^3/UL (ref 0–0.1)
BASOPHILS %: 0.7 % (ref 0–2)
BILIRUBIN,DIRECT: 0 MG/DL (ref 0–0.2)
BILIRUBIN,TOTAL: 0.7 MG/DL (ref 0.2–1.3)
BLOOD UREA NITROGEN: 9 MG/DL (ref 7–20)
CALCIUM: 9 MG/DL (ref 8.4–10.2)
CARBON DIOXIDE: 26 MMOL/L (ref 21–31)
CHLORIDE: 98 MMOL/L (ref 97–110)
CREATININE: 0.62 MG/DL (ref 0.61–1.24)
EOSINOPHILS #: 0 10^3/UL (ref 0–0.5)
EOSINOPHILS %: 0.2 % (ref 0–7)
ETHANOL: 76 MG/DL
GLOBULIN: 3.6 G/DL (ref 1.3–3.2)
GLUCOSE: 97 MG/DL (ref 70–220)
HEMATOCRIT: 45.1 % (ref 42–52)
HEMOGLOBIN: 15.6 G/DL (ref 14–18)
LIPASE: 50 U/L (ref 23–300)
LYMPHOCYTES #: 2.3 10^3/UL (ref 0.8–2.9)
LYMPHOCYTES %: 25.8 % (ref 15–51)
MEAN CORPUSCULAR HEMOGLOBIN: 30 PG (ref 29–33)
MEAN CORPUSCULAR HGB CONC: 34.6 G/DL (ref 32–37)
MEAN CORPUSCULAR VOLUME: 86.7 FL (ref 82–101)
MEAN PLATELET VOLUME: 9.3 FL (ref 7.4–10.4)
MONOCYTE #: 0.7 10^3/UL (ref 0.3–0.9)
MONOCYTES %: 8.1 % (ref 0–11)
NEUTROPHIL #: 5.8 10^3/UL (ref 1.6–7.5)
NEUTROPHILS %: 65 % (ref 39–77)
NUCLEATED RED BLOOD CELLS #: 0 10^3/UL (ref 0–0)
NUCLEATED RED BLOOD CELLS%: 0 /100WBC (ref 0–0)
PLATELET COUNT: 274 10^3/UL (ref 140–415)
POTASSIUM: 3.2 MMOL/L (ref 3.5–5.1)
RED BLOOD COUNT: 5.2 10^6/UL (ref 4.7–6.1)
RED CELL DISTRIBUTION WIDTH: 13.3 % (ref 11.5–14.5)
SODIUM: 140 MMOL/L (ref 135–144)
TOTAL PROTEIN: 8.3 G/DL (ref 6.1–8.1)
UR ASCORBIC ACID: NEGATIVE MG/DL
UR BILIRUBIN (DIP): NEGATIVE MG/DL
UR BLOOD (DIP): NEGATIVE MG/DL
UR CLARITY: CLEAR
UR COLOR: YELLOW
UR GLUCOSE (DIP): NEGATIVE MG/DL
UR KETONES (DIP): NEGATIVE MG/DL
UR LEUKOCYTE ESTERASE (DIP): NEGATIVE LEU/UL
UR NITRITE (DIP): NEGATIVE MG/DL
UR PH (DIP): 6 (ref 5–9)
UR SPECIFIC GRAVITY (DIP): 1.01 (ref 1–1.03)
UR TOTAL PROTEIN (DIP): NEGATIVE MG/DL
UR UROBILINOGEN (DIP): NEGATIVE MG/DL
WHITE BLOOD COUNT: 8.9 10^3/UL (ref 4.8–10.8)

## 2018-06-13 PROCEDURE — 96374 THER/PROPH/DIAG INJ IV PUSH: CPT

## 2018-06-13 PROCEDURE — 99284 EMERGENCY DEPT VISIT MOD MDM: CPT

## 2018-06-13 PROCEDURE — 80307 DRUG TEST PRSMV CHEM ANLYZR: CPT

## 2018-06-13 PROCEDURE — 83690 ASSAY OF LIPASE: CPT

## 2018-06-13 PROCEDURE — 80053 COMPREHEN METABOLIC PANEL: CPT

## 2018-06-13 PROCEDURE — 85025 COMPLETE CBC W/AUTO DIFF WBC: CPT

## 2018-06-13 PROCEDURE — 81003 URINALYSIS AUTO W/O SCOPE: CPT

## 2018-06-13 PROCEDURE — 36415 COLL VENOUS BLD VENIPUNCTURE: CPT

## 2018-06-13 PROCEDURE — 96375 TX/PRO/DX INJ NEW DRUG ADDON: CPT

## 2018-06-13 RX ADMIN — LORAZEPAM 1 MG: 2 INJECTION, SOLUTION INTRAMUSCULAR; INTRAVENOUS at 12:47

## 2018-06-13 RX ADMIN — THIAMINE HYDROCHLORIDE 1 MLS/HR: 100 INJECTION, SOLUTION INTRAMUSCULAR; INTRAVENOUS at 12:10

## 2018-06-13 RX ADMIN — ONDANSETRON HYDROCHLORIDE 1 MG: 2 INJECTION, SOLUTION INTRAMUSCULAR; INTRAVENOUS at 12:47

## 2018-07-12 ENCOUNTER — HOSPITAL ENCOUNTER (EMERGENCY)
Dept: HOSPITAL 91 - E/R | Age: 43
LOS: 1 days | Discharge: HOME | End: 2018-07-13
Payer: MEDICAID

## 2018-07-12 ENCOUNTER — HOSPITAL ENCOUNTER (EMERGENCY)
Age: 43
LOS: 1 days | Discharge: HOME | End: 2018-07-13

## 2018-07-12 DIAGNOSIS — R11.10: Primary | ICD-10-CM

## 2018-07-12 DIAGNOSIS — R19.7: ICD-10-CM

## 2018-07-12 DIAGNOSIS — E87.6: ICD-10-CM

## 2018-07-12 PROCEDURE — 83690 ASSAY OF LIPASE: CPT

## 2018-07-12 PROCEDURE — 36415 COLL VENOUS BLD VENIPUNCTURE: CPT

## 2018-07-12 PROCEDURE — 96374 THER/PROPH/DIAG INJ IV PUSH: CPT

## 2018-07-12 PROCEDURE — 99284 EMERGENCY DEPT VISIT MOD MDM: CPT

## 2018-07-12 PROCEDURE — 80307 DRUG TEST PRSMV CHEM ANLYZR: CPT

## 2018-07-12 PROCEDURE — 81003 URINALYSIS AUTO W/O SCOPE: CPT

## 2018-07-12 PROCEDURE — 96375 TX/PRO/DX INJ NEW DRUG ADDON: CPT

## 2018-07-12 PROCEDURE — 85025 COMPLETE CBC W/AUTO DIFF WBC: CPT

## 2018-07-12 PROCEDURE — 80053 COMPREHEN METABOLIC PANEL: CPT

## 2018-07-13 LAB
ADD MAN DIFF?: NO
ALANINE AMINOTRANSFERASE: 30 IU/L (ref 13–69)
ALBUMIN/GLOBULIN RATIO: 1.37
ALBUMIN: 4.4 G/DL (ref 3.3–4.9)
ALKALINE PHOSPHATASE: 130 IU/L (ref 42–121)
ANION GAP: 12 (ref 8–16)
ASPARTATE AMINO TRANSFERASE: 33 IU/L (ref 15–46)
BASOPHIL #: 0.1 10^3/UL (ref 0–0.1)
BASOPHILS %: 0.6 % (ref 0–2)
BILIRUBIN,DIRECT: 0 MG/DL (ref 0–0.2)
BILIRUBIN,TOTAL: 1 MG/DL (ref 0.2–1.3)
BLOOD UREA NITROGEN: 4 MG/DL (ref 7–20)
CALCIUM: 9.1 MG/DL (ref 8.4–10.2)
CARBON DIOXIDE: 28 MMOL/L (ref 21–31)
CHLORIDE: 100 MMOL/L (ref 97–110)
CREATININE: 0.6 MG/DL (ref 0.61–1.24)
EOSINOPHILS #: 0.1 10^3/UL (ref 0–0.5)
EOSINOPHILS %: 1.1 % (ref 0–7)
ETHANOL: < 10 MG/DL
GLOBULIN: 3.2 G/DL (ref 1.3–3.2)
GLUCOSE: 106 MG/DL (ref 70–220)
HEMATOCRIT: 42.5 % (ref 42–52)
HEMOGLOBIN: 14.9 G/DL (ref 14–18)
LIPASE: 26 U/L (ref 23–300)
LYMPHOCYTES #: 2.6 10^3/UL (ref 0.8–2.9)
LYMPHOCYTES %: 32 % (ref 15–51)
MEAN CORPUSCULAR HEMOGLOBIN: 30.5 PG (ref 29–33)
MEAN CORPUSCULAR HGB CONC: 35.1 G/DL (ref 32–37)
MEAN CORPUSCULAR VOLUME: 86.9 FL (ref 82–101)
MEAN PLATELET VOLUME: 9.8 FL (ref 7.4–10.4)
MONOCYTE #: 0.4 10^3/UL (ref 0.3–0.9)
MONOCYTES %: 5.2 % (ref 0–11)
NEUTROPHIL #: 5 10^3/UL (ref 1.6–7.5)
NEUTROPHILS %: 60.9 % (ref 39–77)
NUCLEATED RED BLOOD CELLS #: 0 10^3/UL (ref 0–0)
NUCLEATED RED BLOOD CELLS%: 0 /100WBC (ref 0–0)
PLATELET COUNT: 224 10^3/UL (ref 140–415)
POTASSIUM: 3.2 MMOL/L (ref 3.5–5.1)
RED BLOOD COUNT: 4.89 10^6/UL (ref 4.7–6.1)
RED CELL DISTRIBUTION WIDTH: 12.6 % (ref 11.5–14.5)
SODIUM: 137 MMOL/L (ref 135–144)
TOTAL PROTEIN: 7.6 G/DL (ref 6.1–8.1)
URINE PH (DIP) POC: 7 (ref 5–8.5)
WHITE BLOOD COUNT: 8.2 10^3/UL (ref 4.8–10.8)

## 2018-07-13 RX ADMIN — FAMOTIDINE 1 MG: 10 INJECTION, SOLUTION INTRAVENOUS at 01:28

## 2018-07-13 RX ADMIN — ONDANSETRON HYDROCHLORIDE 1 MG: 2 INJECTION, SOLUTION INTRAMUSCULAR; INTRAVENOUS at 01:28

## 2018-07-13 RX ADMIN — POTASSIUM CHLORIDE 1 MEQ: 1500 TABLET, EXTENDED RELEASE ORAL at 03:27

## 2018-07-13 RX ADMIN — HYDROCODONE BITARTRATE AND ACETAMINOPHEN 1 TAB: 5; 325 TABLET ORAL at 03:26

## 2018-07-13 RX ADMIN — ALUMINUM HYDROXIDE, MAGNESIUM HYDROXIDE, DIMETHICONE 1 ML: 200; 200; 20 SUSPENSION ORAL at 03:26

## 2018-11-05 ENCOUNTER — HOSPITAL ENCOUNTER (EMERGENCY)
Age: 43
Discharge: HOME | End: 2018-11-05

## 2018-11-05 ENCOUNTER — HOSPITAL ENCOUNTER (EMERGENCY)
Dept: HOSPITAL 91 - E/R | Age: 43
Discharge: HOME | End: 2018-11-05
Payer: MEDICAID

## 2018-11-05 DIAGNOSIS — F10.10: Primary | ICD-10-CM

## 2018-11-05 DIAGNOSIS — Z87.891: ICD-10-CM

## 2018-11-05 LAB
ADD MAN DIFF?: NO
ALANINE AMINOTRANSFERASE: 32 IU/L (ref 13–69)
ALBUMIN/GLOBULIN RATIO: 1.27
ALBUMIN: 4.6 G/DL (ref 3.3–4.9)
ALKALINE PHOSPHATASE: 116 IU/L (ref 42–121)
ANION GAP: 13 (ref 5–13)
ASPARTATE AMINO TRANSFERASE: 37 IU/L (ref 15–46)
BASOPHIL #: 0 10^3/UL (ref 0–0.1)
BASOPHILS %: 0.6 % (ref 0–2)
BILIRUBIN,DIRECT: 0 MG/DL (ref 0–0.2)
BILIRUBIN,TOTAL: 0.7 MG/DL (ref 0.2–1.3)
BLOOD UREA NITROGEN: 7 MG/DL (ref 7–20)
CALCIUM: 9.1 MG/DL (ref 8.4–10.2)
CARBON DIOXIDE: 24 MMOL/L (ref 21–31)
CHLORIDE: 102 MMOL/L (ref 97–110)
CREATININE: 0.65 MG/DL (ref 0.61–1.24)
EOSINOPHILS #: 0.1 10^3/UL (ref 0–0.5)
EOSINOPHILS %: 0.7 % (ref 0–7)
ETHANOL: 198 MG/DL
GLOBULIN: 3.6 G/DL (ref 1.3–3.2)
GLUCOSE: 107 MG/DL (ref 70–220)
HEMATOCRIT: 44.9 % (ref 42–52)
HEMOGLOBIN: 15.4 G/DL (ref 14–18)
IMMATURE GRANS #M: 0.02 10^3/UL (ref 0–0.03)
IMMATURE GRANS % (M): 0.3 % (ref 0–0.43)
LIPASE: 103 U/L (ref 23–300)
LYMPHOCYTES #: 2.5 10^3/UL (ref 0.8–2.9)
LYMPHOCYTES %: 36.9 % (ref 15–51)
MEAN CORPUSCULAR HEMOGLOBIN: 30.5 PG (ref 29–33)
MEAN CORPUSCULAR HGB CONC: 34.3 G/DL (ref 32–37)
MEAN CORPUSCULAR VOLUME: 88.9 FL (ref 82–101)
MEAN PLATELET VOLUME: 9.2 FL (ref 7.4–10.4)
MONOCYTE #: 0.4 10^3/UL (ref 0.3–0.9)
MONOCYTES %: 6.2 % (ref 0–11)
NEUTROPHIL #: 3.7 10^3/UL (ref 1.6–7.5)
NEUTROPHILS %: 55.3 % (ref 39–77)
NUCLEATED RED BLOOD CELLS #: 0 10^3/UL (ref 0–0)
NUCLEATED RED BLOOD CELLS%: 0 /100WBC (ref 0–0)
PLATELET COUNT: 264 10^3/UL (ref 140–415)
POTASSIUM: 4.2 MMOL/L (ref 3.5–5.1)
RED BLOOD COUNT: 5.05 10^6/UL (ref 4.7–6.1)
RED CELL DISTRIBUTION WIDTH: 12.5 % (ref 11.5–14.5)
SODIUM: 139 MMOL/L (ref 135–144)
TOTAL PROTEIN: 8.2 G/DL (ref 6.1–8.1)
TROPONIN-I: < 0.012 NG/ML (ref 0–0.12)
URINE PH (DIP) POC: 5.5 (ref 5–8.5)
WHITE BLOOD COUNT: 6.7 10^3/UL (ref 4.8–10.8)

## 2018-11-05 PROCEDURE — 96361 HYDRATE IV INFUSION ADD-ON: CPT

## 2018-11-05 PROCEDURE — 80053 COMPREHEN METABOLIC PANEL: CPT

## 2018-11-05 PROCEDURE — 83690 ASSAY OF LIPASE: CPT

## 2018-11-05 PROCEDURE — 84484 ASSAY OF TROPONIN QUANT: CPT

## 2018-11-05 PROCEDURE — 96375 TX/PRO/DX INJ NEW DRUG ADDON: CPT

## 2018-11-05 PROCEDURE — 85025 COMPLETE CBC W/AUTO DIFF WBC: CPT

## 2018-11-05 PROCEDURE — 96374 THER/PROPH/DIAG INJ IV PUSH: CPT

## 2018-11-05 PROCEDURE — 71045 X-RAY EXAM CHEST 1 VIEW: CPT

## 2018-11-05 PROCEDURE — 81003 URINALYSIS AUTO W/O SCOPE: CPT

## 2018-11-05 PROCEDURE — 93005 ELECTROCARDIOGRAM TRACING: CPT

## 2018-11-05 PROCEDURE — 99285 EMERGENCY DEPT VISIT HI MDM: CPT

## 2018-11-05 PROCEDURE — 80307 DRUG TEST PRSMV CHEM ANLYZR: CPT

## 2018-11-05 PROCEDURE — 36415 COLL VENOUS BLD VENIPUNCTURE: CPT

## 2018-11-05 RX ADMIN — LORAZEPAM 1 MG: 2 INJECTION, SOLUTION INTRAMUSCULAR; INTRAVENOUS at 18:56

## 2018-11-05 RX ADMIN — THIAMINE HYDROCHLORIDE 1 MLS/HR: 100 INJECTION, SOLUTION INTRAMUSCULAR; INTRAVENOUS at 17:48

## 2018-11-05 RX ADMIN — ONDANSETRON HYDROCHLORIDE 1 MG: 2 INJECTION, SOLUTION INTRAMUSCULAR; INTRAVENOUS at 18:06

## 2019-05-17 ENCOUNTER — HOSPITAL ENCOUNTER (EMERGENCY)
Dept: HOSPITAL 10 - E/R | Age: 44
Discharge: HOME | End: 2019-05-17
Payer: MEDICAID

## 2019-05-17 VITALS
HEIGHT: 70 IN | HEIGHT: 70 IN | WEIGHT: 167.11 LBS | WEIGHT: 167.11 LBS | BODY MASS INDEX: 23.92 KG/M2 | BODY MASS INDEX: 23.92 KG/M2

## 2019-05-17 VITALS — SYSTOLIC BLOOD PRESSURE: 124 MMHG | HEART RATE: 81 BPM | RESPIRATION RATE: 14 BRPM | DIASTOLIC BLOOD PRESSURE: 83 MMHG

## 2019-05-17 DIAGNOSIS — K29.51: Primary | ICD-10-CM

## 2019-05-17 DIAGNOSIS — Z87.891: ICD-10-CM

## 2019-05-17 PROCEDURE — 93005 ELECTROCARDIOGRAM TRACING: CPT

## 2019-05-17 PROCEDURE — 96374 THER/PROPH/DIAG INJ IV PUSH: CPT

## 2019-05-17 PROCEDURE — 36415 COLL VENOUS BLD VENIPUNCTURE: CPT

## 2019-05-17 PROCEDURE — 96361 HYDRATE IV INFUSION ADD-ON: CPT

## 2019-05-17 PROCEDURE — 85025 COMPLETE CBC W/AUTO DIFF WBC: CPT

## 2019-05-17 PROCEDURE — 81003 URINALYSIS AUTO W/O SCOPE: CPT

## 2019-05-17 PROCEDURE — 83690 ASSAY OF LIPASE: CPT

## 2019-05-17 PROCEDURE — 80053 COMPREHEN METABOLIC PANEL: CPT

## 2019-05-17 PROCEDURE — 71045 X-RAY EXAM CHEST 1 VIEW: CPT

## 2019-05-17 PROCEDURE — 84484 ASSAY OF TROPONIN QUANT: CPT

## 2019-05-17 PROCEDURE — 76705 ECHO EXAM OF ABDOMEN: CPT

## 2019-05-17 NOTE — ERD
ER Documentation


Chief Complaint


Chief Complaint





Complains of epigastric pain x3 days





HPI


44-year-old male complaining of epigastric pain for the past 3 days.  He has had


associated nausea with nonbilious vomiting.  He has seen specks of black in his 


vomit.  That started today.  He has also had one episode of melena today.  He 


denies any fevers or chills.  Epigastric pain is aching, radiating to his back, 


with no alleviating or exacerbating factors.  Denies chest pain or shortness of 


breath.





ROS


All systems reviewed and are negative except as per history of present illness.





Medications


Home Meds


Active Scripts


Metoclopramide Hcl* (Metoclopramide Hcl*) 10 Mg Tablet, 10 MG PO Q6H PRN for 


NAUSEA AND OR VOMITING, #10 TAB


   Prov:JEANNINE CARVER MD         5/17/19


Pantoprazole* (Protonix*) 40 Mg Tablet., 40 MG PO DAILY, #30 TAB


   Prov:JEANNINE CARVER MD         5/17/19


Reported Medications


Pantoprazole* (Pantoprazole*) 40 Mg Tablet., 40 MG PO AC BREAKFAST, TAB


   5/17/19


Discontinued Reported Medications


Omeprazole* (Omeprazole*) 40 Mg Capsule., 40 MG PO BID, #30 CAP


   5/29/18


Discontinued Scripts


Lorazepam* (Ativan*) 0.5 Mg Tablet, 0.5 MG PO Q8, #10 TAB


   Prov:CHIN MENDOZA MD         11/5/18


Loperamide Hcl* (Imodium*) 2 Mg Capsule, 2 MG PO .AFTER EA LOOSE BM PRN for 


DIARRHEA, #10 TAB


   Prov:CHIN MENDOZA MD         7/13/18


Ondansetron (Ondansetron Odt) 4 Mg Tab.rapdis, 4 MG PO Q6H PRN for NAUSEA AND/OR


VOMITING, #10 TAB


   Prov:CHIN MENDOZA MD         7/13/18


Ondansetron Hcl* (Zofran*) 8 Mg Tab, 8 MG PO Q6H PRN for NAUSEA AND OR VOMITING,


#20 TAB


   Prov:KRISHNA SANZ MD         6/13/18





Allergies


Allergies:  


Coded Allergies:  


     sulfamethoxazole (Verified  Allergy, Mild, HIVES, 5/17/19)


     trimethoprim (Verified  Allergy, Mild, HIVES, 5/17/19)





PMhx/Soc


Medical and Surgical Hx:  pt denies Medical Hx, pt denies Surgical Hx


History of Surgery:  No


Anesthesia Reaction:  No


Hx Neurological Disorder:  Yes (Stroke 2017)


Hx Respiratory Disorders:  No


Hx Cardiac Disorders:  No


Hx Psychiatric Problems:  No


Hx Miscellaneous Medical Probl:  Yes (alcoholic)


Hx Alcohol Use:  Yes (CURRENT EVERYDAY USE)


Hx Substance Use:  No


Hx Tobacco Use:  Yes


Smoking Status:  Never smoker





FmHx


Family History:  No diabetes





Physical Exam


Vitals





Vital Signs


  Date      Temp  Pulse  Resp  B/P (MAP)   Pulse Ox  O2          O2 Flow    FiO2


Time                                                 Delivery    Rate


   5/17/19  98.3     81    14      124/83        99  Room Air


     20:00                           (97)


   5/17/19           77    16      133/79        99  Room Air


     18:30                           (97)


   5/17/19  98.4    128    20      146/89        97


     16:31                          (108)





Physical Exam


Const:   No acute distress


Head:   Atraumatic 


Eyes:    Normal Conjunctiva


ENT:    Normal External Ears, Nose and Mouth.


Neck:               Full range of motion. No meningismus.


Resp:   Clear to auscultation bilaterally


Cardio:   Regular rate and rhythm, no murmurs


Abd:    Soft, non tender, non distended. Normal bowel sounds


Skin:   No petechiae or rashes


Back:   No midline or flank tenderness


Ext:    No cyanosis, or edema


Neur:   Awake and alert


Psych:    Normal Mood and Affect


Result Diagram:  


5/17/19 1718                                                                    


           5/17/19 1718





Results 24 hrs





Laboratory Tests


      Test
                                  5/17/19
17:11   5/17/19
17:18


      Urine Color                          COLORLESS


      Urine Clarity                        CLEAR


      Urine pH                                        7.0


      Urine Specific Gravity                        1.000


      Urine Ketones                        NEGATIVE mg/dL


      Urine Nitrite                        NEGATIVE mg/dL


      Urine Bilirubin                      NEGATIVE mg/dL


      Urine Urobilinogen                   NEGATIVE mg/dL


      Urine Leukocyte Esterase
            NEGATIVE
Mick/ul  



      Urine Hemoglobin                     NEGATIVE mg/dL


      Urine Glucose                        NEGATIVE mg/dL


      Urine Total Protein                  NEGATIVE mg/dl


      White Blood Count                                       5.3 10^3/ul


      Red Blood Count                                        5.00 10^6/ul


      Hemoglobin                                                15.1 g/dl


      Hematocrit                                                   43.3 %


      Mean Corpuscular Volume                                     86.6 fl


      Mean Corpuscular Hemoglobin                                 30.2 pg


      Mean Corpuscular Hemoglobin
Concent  
                   34.9 g/dl 



      Red Cell Distribution Width                                  12.0 %


      Platelet Count                                          250 10^3/UL


      Mean Platelet Volume                                         9.8 fl


      Immature Granulocytes %                                     0.200 %


      Neutrophils %                                                62.7 %


      Lymphocytes %                                                28.5 %


      Monocytes %                                                   6.9 %


      Eosinophils %                                                 1.1 %


      Basophils %                                                   0.6 %


      Nucleated Red Blood Cells %                             0.0 /100WBC


      Immature Granulocytes #                               0.010 10^3/ul


      Neutrophils #                                           3.4 10^3/ul


      Lymphocytes #                                           1.5 10^3/ul


      Monocytes #                                             0.4 10^3/ul


      Eosinophils #                                           0.1 10^3/ul


      Basophils #                                             0.0 10^3/ul


      Nucleated Red Blood Cells #                             0.0 10^3/ul


      Sodium Level                                             138 mmol/L


      Potassium Level                                          3.6 mmol/L


      Chloride Level                                           102 mmol/L


      Carbon Dioxide Level                                      27 mmol/L


      Anion Gap                                                         9


      Blood Urea Nitrogen                                         3 mg/dl


      Creatinine                                               0.60 mg/dl


      Est Glomerular Filtrat Rate
mL/min   
                > 60 mL/min 



      Glucose Level                                             120 mg/dl


      Calcium Level                                             9.3 mg/dl


      Total Bilirubin                                           0.6 mg/dl


      Direct Bilirubin                                         0.00 mg/dl


      Indirect Bilirubin                                        0.6 mg/dl


      Aspartate Amino Transf
(AST/SGOT)    
                     36 IU/L 



      Alanine Aminotransferase
(ALT/SGPT)  
                     38 IU/L 



      Alkaline Phosphatase                                       109 IU/L


      Troponin I                                            < 0.012 ng/ml


      Total Protein                                              8.0 g/dl


      Albumin                                                    4.4 g/dl


      Globulin                                                  3.60 g/dl


      Albumin/Globulin Ratio                                         1.22


      Lipase                                                       36 U/L





Current Medications


 Medications
   Dose
          Sig/Dejon
       Start Time
   Status  Last


 (Trade)       Ordered        Route
 PRN     Stop Time              Admin
Dose


                              Reason                                Admin


 Sodium         1,000 ml @ 
   Q1H STAT
      5/17/19       DC           5/17/19


Chloride       1,000 mls/hr   IV
            17:03
                       17:50



                                             5/17/19 18:02


 Ondansetron    4 mg           ONCE  STAT
    5/17/19       DC           5/17/19


HCl
  (Zofran                 IV
            17:03
                       17:50



Inj)                                         5/17/19 17:06


 Famotidine
    20 mg          ONCE  STAT
    5/17/19       DC           5/17/19


(Pepcid)                      PO
            19:00
                       19:13



                                             5/17/19 19:01


                40 ml          ONCE  STAT
    5/17/19       DC           5/17/19


Miscellaneous                 PO
            19:00
                       19:13




 Medication
                                5/17/19 19:01


 (Gi Cocktail


(2))


                40 mg          ONCE  ONCE
    5/17/19       DC           5/17/19


Pantoprazole
                 PO
            19:00
                       19:13



 (Protonix                                   5/17/19 19:01


Tab)








Procedures/MDM


EMERGENT LABS AND DIAGNOSTIC STUDIES: 


Lab Results above were reviewed and interpreted by me. 





CBC: no anemia or evidence of infection


CMP: No evidence of clinically significant electrolyte abnormality, acidosis, 


renal failure, hypoglycemia, liver disease, or biliary obstruction


Lipase: no evidence of pancreatitis 


Troponin within normal limits, not indicative of cardiac ischemia


UA: no evidence of infection





12-lead EKG was interpreted by ROMI Carver MD: 


Normal Sinus Rhythm with ventricular rate of 84 beats per minute 


Normal axis 


Normal intervals 


No acute ST or T wave changes suggestive of acute ischemia or STEMI. 


___________________________________________________________ 


Radiology Results as interpreted by Radiology below were reviewed by ALICE Carver MD: 





Chest x-ray shows no acute abnormalities 





ultrasound right upper quadrant shows no acute abnormalities





___________________________________________________________ 


Initial Nursing notes reviewed. 


Previous Medical Records requested via the Electronic Health Record. 





EMERGENCY DEPARTMENT COURSE / MEDICAL DECISION MAKING: 





Patient is presenting with epigastric pain.  Vitals are unremarkable.  Symptoms 


are very consistent with gastritis.  However an upper abdominal ultrasound was 


done as well as labs to evaluate for possible cholecystitis, pancreatitis, or an


y signs of infection.  Labs are unremarkable.  Ultrasound did not show any 


significant abnormalities.  No evidence of ACS or pneumonia.  Doubt dissection. 


Patient was treated with a GI cocktail with resolution of his symptoms.  He 


feels much better upon discharge.  I did refill his pantoprazole and recommended


he follow-up with his primary care doctor within the next few days.  Return 


precautions given.








Patient's blood pressure was elevated (>120/80) but appears stable without 


evidence of hypertensive emergency or urgency. The patient was counseled about 


the risks of hypertension and urged to pursue outpatient monitoring and therapy 


within a week with their primary care physician.





Departure


Diagnosis:  


   Primary Impression:  


   Gastritis


   Gastritis type:  unspecified gastritis  Chronicity:  chronic  Gastritis 


   bleeding:  with bleeding  Qualified Codes:  K29.51 - Unspecified chronic 


   gastritis with bleeding


Condition:  Stable


Patient Instructions:  Gi Bleed, Upper (Stable), Gastritis Vs. Ulcer











JEANNINE CARVER MD         May 17, 2019 19:50

## 2019-05-19 NOTE — RADRPT
Vent Rate: 84 bpm

RR Interval: 0 msec

KS Interval: 136 msec

QRS Duration: 88 msec

QT Interval: 370 msec

QTC Interval: 437 msec

P-R-T Axis: 69 - 76 - 68 degrees

 

 Normal sinus rhythm with sinus arrhythmia

 Normal ECG

 

Electronically Signed By: *[facility]

## 2019-09-22 ENCOUNTER — HOSPITAL ENCOUNTER (EMERGENCY)
Dept: HOSPITAL 91 - E/R | Age: 44
Discharge: HOME | End: 2019-09-22
Payer: MEDICAID

## 2019-09-22 DIAGNOSIS — F17.210: ICD-10-CM

## 2019-09-22 DIAGNOSIS — R10.9: Primary | ICD-10-CM

## 2019-09-22 LAB
ADD MAN DIFF?: NO
ADD UMIC: NO
ALANINE AMINOTRANSFERASE: 27 IU/L (ref 13–69)
ALBUMIN/GLOBULIN RATIO: 1.15
ALBUMIN: 3.8 G/DL (ref 3.3–4.9)
ALKALINE PHOSPHATASE: 65 IU/L (ref 42–121)
ANION GAP: 6 (ref 5–13)
ASPARTATE AMINO TRANSFERASE: 24 IU/L (ref 15–46)
BASOPHIL #: 0.1 10^3/UL (ref 0–0.1)
BASOPHILS %: 0.6 % (ref 0–2)
BILIRUBIN,DIRECT: 0 MG/DL (ref 0–0.2)
BILIRUBIN,TOTAL: 0.5 MG/DL (ref 0.2–1.3)
BLOOD UREA NITROGEN: 10 MG/DL (ref 7–20)
CALCIUM: 8.9 MG/DL (ref 8.4–10.2)
CARBON DIOXIDE: 28 MMOL/L (ref 21–31)
CHLORIDE: 108 MMOL/L (ref 97–110)
CREATININE: 0.75 MG/DL (ref 0.61–1.24)
EOSINOPHILS #: 0.2 10^3/UL (ref 0–0.5)
EOSINOPHILS %: 2.7 % (ref 0–7)
GLOBULIN: 3.3 G/DL (ref 1.3–3.2)
GLUCOSE: 97 MG/DL (ref 70–220)
HEMATOCRIT: 41.6 % (ref 42–52)
HEMOGLOBIN: 13.6 G/DL (ref 14–18)
IMMATURE GRANS #M: 0.02 10^3/UL (ref 0–0.03)
IMMATURE GRANS % (M): 0.2 % (ref 0–0.43)
LIPASE: 74 U/L (ref 23–300)
LYMPHOCYTES #: 1.8 10^3/UL (ref 0.8–2.9)
LYMPHOCYTES %: 22.4 % (ref 15–51)
MEAN CORPUSCULAR HEMOGLOBIN: 30.1 PG (ref 29–33)
MEAN CORPUSCULAR HGB CONC: 32.7 G/DL (ref 32–37)
MEAN CORPUSCULAR VOLUME: 92 FL (ref 82–101)
MEAN PLATELET VOLUME: 10.9 FL (ref 7.4–10.4)
MONOCYTE #: 0.6 10^3/UL (ref 0.3–0.9)
MONOCYTES %: 7.2 % (ref 0–11)
NEUTROPHIL #: 5.5 10^3/UL (ref 1.6–7.5)
NEUTROPHILS %: 66.9 % (ref 39–77)
NUCLEATED RED BLOOD CELLS #: 0 10^3/UL (ref 0–0)
NUCLEATED RED BLOOD CELLS%: 0 /100WBC (ref 0–0)
PLATELET COUNT: 233 10^3/UL (ref 140–415)
POTASSIUM: 3.9 MMOL/L (ref 3.5–5.1)
RED BLOOD COUNT: 4.52 10^6/UL (ref 4.7–6.1)
RED CELL DISTRIBUTION WIDTH: 13 % (ref 11.5–14.5)
SODIUM: 142 MMOL/L (ref 135–144)
TOTAL PROTEIN: 7.1 G/DL (ref 6.1–8.1)
UR ASCORBIC ACID: NEGATIVE MG/DL
UR BILIRUBIN (DIP): NEGATIVE MG/DL
UR BLOOD (DIP): NEGATIVE MG/DL
UR CLARITY: CLEAR
UR COLOR: (no result)
UR GLUCOSE (DIP): NEGATIVE MG/DL
UR KETONES (DIP): NEGATIVE MG/DL
UR LEUKOCYTE ESTERASE (DIP): NEGATIVE LEU/UL
UR NITRITE (DIP): NEGATIVE MG/DL
UR PH (DIP): 6 (ref 5–9)
UR SPECIFIC GRAVITY (DIP): 1 (ref 1–1.03)
UR TOTAL PROTEIN (DIP): NEGATIVE MG/DL
UR UROBILINOGEN (DIP): NEGATIVE MG/DL
WHITE BLOOD COUNT: 8.2 10^3/UL (ref 4.8–10.8)

## 2019-09-22 PROCEDURE — 96374 THER/PROPH/DIAG INJ IV PUSH: CPT

## 2019-09-22 PROCEDURE — 99285 EMERGENCY DEPT VISIT HI MDM: CPT

## 2019-09-22 PROCEDURE — 74176 CT ABD & PELVIS W/O CONTRAST: CPT

## 2019-09-22 PROCEDURE — 96376 TX/PRO/DX INJ SAME DRUG ADON: CPT

## 2019-09-22 PROCEDURE — 80053 COMPREHEN METABOLIC PANEL: CPT

## 2019-09-22 PROCEDURE — 81003 URINALYSIS AUTO W/O SCOPE: CPT

## 2019-09-22 PROCEDURE — 85025 COMPLETE CBC W/AUTO DIFF WBC: CPT

## 2019-09-22 PROCEDURE — 83690 ASSAY OF LIPASE: CPT

## 2019-09-22 PROCEDURE — 96375 TX/PRO/DX INJ NEW DRUG ADDON: CPT

## 2019-09-22 PROCEDURE — 36415 COLL VENOUS BLD VENIPUNCTURE: CPT

## 2019-09-22 RX ADMIN — KETOROLAC TROMETHAMINE 1 MG: 15 INJECTION, SOLUTION INTRAMUSCULAR; INTRAVENOUS at 14:15

## 2019-09-22 RX ADMIN — THIAMINE HYDROCHLORIDE 1 MLS/HR: 100 INJECTION, SOLUTION INTRAMUSCULAR; INTRAVENOUS at 12:33

## 2019-09-22 RX ADMIN — ONDANSETRON HYDROCHLORIDE 1 MG: 2 INJECTION, SOLUTION INTRAMUSCULAR; INTRAVENOUS at 12:57

## 2019-09-22 RX ADMIN — KETOROLAC TROMETHAMINE 1 MG: 15 INJECTION, SOLUTION INTRAMUSCULAR; INTRAVENOUS at 12:33
